# Patient Record
Sex: MALE | Race: WHITE | HISPANIC OR LATINO | Employment: OTHER | URBAN - METROPOLITAN AREA
[De-identification: names, ages, dates, MRNs, and addresses within clinical notes are randomized per-mention and may not be internally consistent; named-entity substitution may affect disease eponyms.]

---

## 2017-01-09 RX ORDER — ASPIRIN 325 MG
TABLET, DELAYED RELEASE (ENTERIC COATED) ORAL
Qty: 30 TABLET | Refills: 0 | Status: SHIPPED | OUTPATIENT
Start: 2017-01-09 | End: 2017-02-10 | Stop reason: SDUPTHER

## 2017-02-07 ENCOUNTER — LAB VISIT (OUTPATIENT)
Dept: LAB | Facility: HOSPITAL | Age: 82
End: 2017-02-07
Attending: INTERNAL MEDICINE
Payer: MEDICARE

## 2017-02-07 DIAGNOSIS — R60.0 LOCALIZED EDEMA: ICD-10-CM

## 2017-02-07 DIAGNOSIS — I10 ESSENTIAL HYPERTENSION: ICD-10-CM

## 2017-02-07 DIAGNOSIS — I70.1 ATHEROSCLEROTIC RAS (RENAL ARTERY STENOSIS), BILATERAL: ICD-10-CM

## 2017-02-07 DIAGNOSIS — N18.4 CKD (CHRONIC KIDNEY DISEASE) STAGE 4, GFR 15-29 ML/MIN: ICD-10-CM

## 2017-02-07 LAB
ALBUMIN SERPL BCP-MCNC: 4 G/DL
ANION GAP SERPL CALC-SCNC: 9 MMOL/L
BASOPHILS # BLD AUTO: 0.05 K/UL
BASOPHILS NFR BLD: 0.5 %
BUN SERPL-MCNC: 33 MG/DL
CALCIUM SERPL-MCNC: 9.6 MG/DL
CHLORIDE SERPL-SCNC: 100 MMOL/L
CO2 SERPL-SCNC: 29 MMOL/L
CREAT SERPL-MCNC: 2.4 MG/DL
DIFFERENTIAL METHOD: ABNORMAL
EOSINOPHIL # BLD AUTO: 0.7 K/UL
EOSINOPHIL NFR BLD: 7.4 %
ERYTHROCYTE [DISTWIDTH] IN BLOOD BY AUTOMATED COUNT: 14 %
EST. GFR  (AFRICAN AMERICAN): 28.2 ML/MIN/1.73 M^2
EST. GFR  (NON AFRICAN AMERICAN): 24.4 ML/MIN/1.73 M^2
GLUCOSE SERPL-MCNC: 96 MG/DL
HCT VFR BLD AUTO: 38.2 %
HGB BLD-MCNC: 12.7 G/DL
LYMPHOCYTES # BLD AUTO: 2.5 K/UL
LYMPHOCYTES NFR BLD: 26.4 %
MCH RBC QN AUTO: 30 PG
MCHC RBC AUTO-ENTMCNC: 33.2 %
MCV RBC AUTO: 90 FL
MONOCYTES # BLD AUTO: 0.7 K/UL
MONOCYTES NFR BLD: 7.5 %
NEUTROPHILS # BLD AUTO: 5.5 K/UL
NEUTROPHILS NFR BLD: 57.5 %
PHOSPHATE SERPL-MCNC: 3.1 MG/DL
PLATELET # BLD AUTO: 485 K/UL
PMV BLD AUTO: 9.4 FL
POTASSIUM SERPL-SCNC: 3.8 MMOL/L
PTH-INTACT SERPL-MCNC: 35 PG/ML
RBC # BLD AUTO: 4.23 M/UL
SODIUM SERPL-SCNC: 138 MMOL/L
WBC # BLD AUTO: 9.59 K/UL

## 2017-02-07 PROCEDURE — 36415 COLL VENOUS BLD VENIPUNCTURE: CPT

## 2017-02-07 PROCEDURE — 80069 RENAL FUNCTION PANEL: CPT

## 2017-02-07 PROCEDURE — 83970 ASSAY OF PARATHORMONE: CPT

## 2017-02-07 PROCEDURE — 85025 COMPLETE CBC W/AUTO DIFF WBC: CPT

## 2017-02-10 RX ORDER — ASPIRIN 325 MG
TABLET, DELAYED RELEASE (ENTERIC COATED) ORAL
Qty: 30 TABLET | Refills: 0 | Status: SHIPPED | OUTPATIENT
Start: 2017-02-10 | End: 2017-03-08 | Stop reason: SDUPTHER

## 2017-02-14 ENCOUNTER — LAB VISIT (OUTPATIENT)
Dept: LAB | Facility: HOSPITAL | Age: 82
End: 2017-02-14
Attending: INTERNAL MEDICINE
Payer: MEDICARE

## 2017-02-14 DIAGNOSIS — N18.4 CKD (CHRONIC KIDNEY DISEASE) STAGE 4, GFR 15-29 ML/MIN: ICD-10-CM

## 2017-02-14 DIAGNOSIS — I10 ESSENTIAL HYPERTENSION: ICD-10-CM

## 2017-02-14 DIAGNOSIS — I70.1 ATHEROSCLEROTIC RAS (RENAL ARTERY STENOSIS), BILATERAL: ICD-10-CM

## 2017-02-14 DIAGNOSIS — R60.0 LOCALIZED EDEMA: ICD-10-CM

## 2017-02-14 LAB
BILIRUB UR QL STRIP: NEGATIVE
CLARITY UR REFRACT.AUTO: CLEAR
COLOR UR AUTO: YELLOW
CREAT UR-MCNC: 124 MG/DL
GLUCOSE UR QL STRIP: NEGATIVE
HGB UR QL STRIP: NEGATIVE
KETONES UR QL STRIP: NEGATIVE
LEUKOCYTE ESTERASE UR QL STRIP: NEGATIVE
NITRITE UR QL STRIP: NEGATIVE
PH UR STRIP: 7 [PH] (ref 5–8)
PROT UR QL STRIP: NEGATIVE
PROT UR-MCNC: 11 MG/DL
PROT/CREAT RATIO, UR: 0.09
SP GR UR STRIP: 1.01 (ref 1–1.03)
URN SPEC COLLECT METH UR: NORMAL
UROBILINOGEN UR STRIP-ACNC: NEGATIVE EU/DL

## 2017-02-14 PROCEDURE — 81003 URINALYSIS AUTO W/O SCOPE: CPT

## 2017-02-14 PROCEDURE — 84156 ASSAY OF PROTEIN URINE: CPT

## 2017-03-08 RX ORDER — ASPIRIN 325 MG
TABLET, DELAYED RELEASE (ENTERIC COATED) ORAL
Qty: 30 TABLET | Refills: 0 | Status: SHIPPED | OUTPATIENT
Start: 2017-03-08 | End: 2017-04-05 | Stop reason: SDUPTHER

## 2017-03-15 ENCOUNTER — OFFICE VISIT (OUTPATIENT)
Dept: NEPHROLOGY | Facility: CLINIC | Age: 82
End: 2017-03-15
Payer: MEDICARE

## 2017-03-15 VITALS
DIASTOLIC BLOOD PRESSURE: 71 MMHG | WEIGHT: 196 LBS | HEIGHT: 64 IN | SYSTOLIC BLOOD PRESSURE: 156 MMHG | HEART RATE: 62 BPM | BODY MASS INDEX: 33.46 KG/M2

## 2017-03-15 DIAGNOSIS — R60.0 LOCALIZED EDEMA: ICD-10-CM

## 2017-03-15 DIAGNOSIS — I70.1 ATHEROSCLEROTIC RAS (RENAL ARTERY STENOSIS), BILATERAL: ICD-10-CM

## 2017-03-15 DIAGNOSIS — N18.30 CKD (CHRONIC KIDNEY DISEASE) STAGE 3, GFR 30-59 ML/MIN: Primary | ICD-10-CM

## 2017-03-15 DIAGNOSIS — I10 ESSENTIAL HYPERTENSION: ICD-10-CM

## 2017-03-15 PROCEDURE — 99214 OFFICE O/P EST MOD 30 MIN: CPT | Mod: S$PBB,,, | Performed by: INTERNAL MEDICINE

## 2017-03-15 PROCEDURE — 99213 OFFICE O/P EST LOW 20 MIN: CPT | Mod: PBBFAC | Performed by: INTERNAL MEDICINE

## 2017-03-15 PROCEDURE — 99999 PR PBB SHADOW E&M-EST. PATIENT-LVL III: CPT | Mod: PBBFAC,,, | Performed by: INTERNAL MEDICINE

## 2017-03-15 RX ORDER — DOXAZOSIN 4 MG/1
4 TABLET ORAL NIGHTLY
Qty: 30 TABLET | Refills: 11 | Status: SHIPPED | OUTPATIENT
Start: 2017-03-15 | End: 2018-03-20 | Stop reason: SDUPTHER

## 2017-03-15 RX ORDER — AMLODIPINE BESYLATE 10 MG/1
10 TABLET ORAL DAILY
COMMUNITY
End: 2017-03-15

## 2017-03-15 NOTE — MR AVS SNAPSHOT
[facility]  [address] [facility] [place] [address]  Phone: [phone]  Fax: [phone]                  Pedro Pro   3/15/2017 3:00 PM   Office Visit    Descripción:  Male : 1935   Personal Médico:  Markus Ponce MD   Departamento:  [facility]           Razón de la megan     Chronic Kidney Disease     Hypertension           Diagnósticos de Esta Visita        Comentarios    CKD (chronic kidney disease) stage 3, GFR 30-59 ml/min    -  Primario     Essential hypertension         Atherosclerotic AUGUSTUS (renal artery stenosis), bilateral         Localized edema                Lista de tareas           Citas próximas        Personal Médico Departamento Tfno del dpto    2017 1:30 PM Gab Cruz MD [facility] [phone]    2017 10:00 AM LABORATORY, [address] [facility] [phone]    2017 10:30 AM Markus Ponce MD [facility] [phone]      Metas (5 Years of Data)     Ninguna      Follow-Up and Disposition     Return in about 4 months (around 7/15/2017).    Follow-up and Disposition History      Recetas para recoger        Disp Refills Start End    doxazosin (CARDURA) 4 MG tablet 30 tablet 11 3/15/2017 3/15/2018    Take 1 tablet (4 mg total) by mouth every evening. - Oral    Farmacia: [facility] - [place] - [address] AT [facility] & [address] de tlfo: #: [phone]         [facility]     [facility] de Enfermeras - Asistencia   Enfermeras registradas de [facility] pueden ayudarle a reservar kyleigh megan, proveer educación para la nahed, asesoría clínica, y otros servicios de asesoramiento.   Llame para sundar servicio gratuito a [phone].             Medicamentos           Mensaje sobre Medicamentos     Verificar los cambios y / o adiciones a al régimen de medicación son los mismos que discutir con al médico.  "Si cualquiera de estos cambios o adiciones son incorrectos, por favor notifique a al proveedor de atención médica.        EMPEZAR a elizabeth estos medicamentos NUEVOS        Refills    doxazosin (CARDURA) 4 MG tablet 11    Sig: Take 1 tablet (4 mg total) by mouth every evening.    Categoría: Normal    Vía: Oral      DEJAR de elizabeth estos medicamentos     amlodipine (NORVASC) 10 MG tablet Take 10 mg by mouth once daily.           Verifique que la siguiente lista de medicamentos es kyleigh representación exacta de los medicamentos que está tomando actualmente. Si no hay ningunos reportados, la lista puede estar en painter. Si no es correcta, por favor póngase en contacto con al proveedor de atención médica. Lleve esta lista con usted en marcus de emergencia.           Medicamentos Actuales     aspirin (ECOTRIN) 325 MG EC tablet TAKE 1 TABLET BY MOUTH DAILY.    BYSTOLIC 10 mg Tab Take 1 tablet by mouth once daily.    clopidogrel (PLAVIX) 75 mg tablet Take 75 mg by mouth once daily.    fenofibrate 160 MG Tab Take 160 mg by mouth once daily.    glimepiride (AMARYL) 2 MG tablet Take 2 mg by mouth before breakfast.    hydrochlorothiazide (HYDRODIURIL) 25 MG tablet TAKE 1 TABLET BY MOUTH DAILY    levothyroxine (SYNTHROID) 75 MCG tablet Take 75 mcg by mouth before breakfast.    nifedipine (PROCARDIA-XL) 90 MG (OSM) TR24 Take 1 tablet (90 mg total) by mouth once daily.    pantoprazole (PROTONIX) 40 MG tablet Take 40 mg by mouth 2 (two) times daily.     pravastatin (PRAVACHOL) 20 MG tablet Take 20 mg by mouth once daily.    doxazosin (CARDURA) 4 MG tablet Take 1 tablet (4 mg total) by mouth every evening.    hydrALAZINE (APRESOLINE) 50 MG tablet Take 1 tablet (50 mg total) by mouth every 12 (twelve) hours.           Información de referencia clínica           Tamia signos vitales edvin     PS [place] BMI ([facility])       156/71 62 5' 4" (1.626 m) 88.9 kg (195 lb 15.8 oz) 33.64 kg/m2       Blood Pressure          Most Recent Value    BP "  (!)  156/71      Alergias     A partir del:  3/15/2017        No Known Allergies      Vacunas     Administradas en la fecha de la visita:  3/15/2017        None      Registrarse para [facility]     La activación de al cuenta [facility] es tan fácil topher 1-2-3!    1) Ir a [email or web address], seleccione Registrarse Ahora, meter el código de activación y al fecha de nacimiento, y seleccione Próximo.    [id number]  Expires: 2017  3:07 PM      2) Crear un nombre de usuario y contraseña para usar cuando se visita [facility] en el futuro y selecciona kyleigh pregunta de seguridad en marucs de que pierda al contraseña y seleccione Próximo.    3) Introduzca al dirección de correo electrónico y myra clic en Registrarse!    Información Adicional  Si tiene alguna pregunta, por favor, e-mail [email or web address] o llame al [phone] para hablar con nuestro personal. Recuerde, [facility] no debe ser usada para necesidades urgentes. En marcus de emergencia médica, llame al 911.        Instrucciones    change  Norvasc to procardia xl 90 mg ( d/c norvasc --patient was taking both)  ;HCTZ 25 mg  to help BP and edema ; hydralazine 50 twice daily ; bystolic 10 mg ( HR 60) ; add cardura 4 mg          Language Assistance Services     ATTENTION: Language assistance services are available, free of charge. Please call [phone].      ATENCIÓN: Si habla español, tiene a al disposición servicios gratuitos de asistencia lingüística. Llame al [phone].     CHÚ Ý: N?u b?n nói Ti?ng Vi?t, có các d?ch v? h? tr? ngôn ng? mi?n phí dành cho b?n. G?i s? [phone].         [facility] cumple con las leyes federales aplicables de derechos civiles y no discrimina por motivos de akash, color, origen nacional, edad, discapacidad, o sexo.                 Pedro Pro   3/15/2017 3:00 PM   Office Visit    Description:  Male : 1935   Provider:  Markus Ponce MD   Department:  [facility]           Reason for  Visit     Chronic Kidney Disease     Hypertension           Diagnoses this Visit        Comments    CKD (chronic kidney disease) stage 3, GFR 30-59 ml/min    -  Primary     Essential hypertension         Atherosclerotic AUGUSTUS (renal artery stenosis), bilateral         Localized edema                To Do List           Future Appointments        Provider Department Dept Phone    5/25/2017 1:30 PM Gab Cruz MD [facility] - Ophthalmology [phone]    6/29/2017 10:00 AM LABORATORY, [address] [facility] [phone]    7/6/2017 10:30 AM [facility] [phone]      Goals     None      Follow-Up and Disposition     Return in about 4 months (around 7/15/2017).    Follow-up and Disposition History       These Medications        Disp Refills Start End    doxazosin (CARDURA) 4 MG tablet 30 tablet 11 3/15/2017 3/15/2018    Take 1 tablet (4 mg total) by mouth every evening. - Oral    Pharmacy: [facility] - [address] AT [facility] Ph #: [phone]         [facility]     [facility] Nurse Care Line - 24/7 Assistance  Registered nurses in the [facility] provide clinical advisement, health education, appointment booking, and other advisory services.  Call for this free service at [phone].             Medications           Message regarding Medications     Verify the changes and/or additions to your medication regime listed below are the same as discussed with your clinician today.  If any of these changes or additions are incorrect, please notify your healthcare provider.        START taking these NEW medications        Refills    doxazosin (CARDURA) 4 MG tablet 11    Sig: Take 1 tablet (4 mg total) by mouth every evening.    Class: Normal    Route: Oral      STOP taking these medications     amlodipine (NORVASC) 10 MG tablet Take 10 mg by mouth once  "daily.           Verify that the below list of medications is an accurate representation of the medications you are currently taking.  If none reported, the list may be blank. If incorrect, please contact your healthcare provider. Carry this list with you in case of emergency.           Current Medications     aspirin (ECOTRIN) 325 MG EC tablet TAKE 1 TABLET BY MOUTH DAILY.    BYSTOLIC 10 mg Tab Take 1 tablet by mouth once daily.    clopidogrel (PLAVIX) 75 mg tablet Take 75 mg by mouth once daily.    fenofibrate 160 MG Tab Take 160 mg by mouth once daily.    glimepiride (AMARYL) 2 MG tablet Take 2 mg by mouth before breakfast.    hydrochlorothiazide (HYDRODIURIL) 25 MG tablet TAKE 1 TABLET BY MOUTH DAILY    levothyroxine (SYNTHROID) 75 MCG tablet Take 75 mcg by mouth before breakfast.    nifedipine (PROCARDIA-XL) 90 MG (OSM) TR24 Take 1 tablet (90 mg total) by mouth once daily.    pantoprazole (PROTONIX) 40 MG tablet Take 40 mg by mouth 2 (two) times daily.     pravastatin (PRAVACHOL) 20 MG tablet Take 20 mg by mouth once daily.    doxazosin (CARDURA) 4 MG tablet Take 1 tablet (4 mg total) by mouth every evening.    hydrALAZINE (APRESOLINE) 50 MG tablet Take 1 tablet (50 mg total) by mouth every 12 (twelve) hours.           Clinical Reference Information           Your Vitals Were     BP Pulse Height Weight BMI       156/71 62 5' 4" (1.626 m) 88.9 kg (195 lb 15.8 oz) 33.64 kg/m2       Blood Pressure          Most Recent Value    BP  (!)  156/71      Allergies as of 3/15/2017     No Known Allergies      Immunizations Administered on Date of Encounter - 3/15/2017     None      [facility] Sign-Up     Activating your [facility] account is as easy as 1-2-3!     1) Visit [email or web address], select Sign Up Now, enter this activation code and your date of birth, then select Next.  [email or web address]  Expires: 4/29/2017  3:07 PM      2) Create a username and password to use when you visit [facility] in the future and select a " security question in case you lose your password and select Next.    3) Enter your e-mail address and click Sign Up!    Additional Information  If you have questions, please e-mail [email or web address] or call [phone] to talk to our [facility] staff. Remember, [facility] is NOT to be used for urgent needs. For medical emergencies, dial 911.         Instructions    change  Norvasc to procardia xl 90 mg ( d/c norvasc --patient was taking both)  ;HCTZ 25 mg  to help BP and edema ; hydralazine 50 twice daily ; bystolic 10 mg ( HR 60) ; add cardura 4 mg          Language Assistance Services     ATTENTION: Language assistance services are available, free of charge. Please call [phone].      ATENCIÓN: Si joan guzman, tiene a al disposición servicios gratuitos de asistencia lingüística. Llame al [phone].     CHÚ Ý: N?u b?n nói Ti?ng Vi?t, có các d?ch v? h? tr? ngôn ng? mi?n phí dành cho b?n. G?i s? [phone].         [facility] complies with applicable Federal civil rights laws and does not discriminate on the basis of race, color, national origin, age, disability, or sex.

## 2017-03-15 NOTE — PATIENT INSTRUCTIONS
change  Norvasc to procardia xl 90 mg ( d/c norvasc --patient was taking both)  ;HCTZ 25 mg  to help BP and edema ; hydralazine 50 twice daily ; bystolic 10 mg ( HR 60) ; add cardura 4 mg

## 2017-03-15 NOTE — PROGRESS NOTES
Subjective:       Patient ID: Pedro Pro is a 81 y.o. Unknown male who presents for new evaluation of Chronic Kidney Disease and Hypertension    Hypertension   Pertinent negatives include no chest pain, headaches, neck pain, palpitations or shortness of breath.   Edema   Pertinent negatives include no abdominal pain, arthralgias, chest pain, chills, congestion, coughing, diaphoresis, fatigue, fever, headaches, joint swelling, nausea, neck pain, rash or vomiting.   Patient is a 79-year-old male from [facility]; does not like doctors was  diagnosed with type II diabetes in  ; was found to have a creatinine 1.8 ; work showed diabetic nephropathy.  He does not speak English he had a friend who was translating for him.  From the history of present illness I gather was that he has had high blood pressure and multiple family members with severe diabetes and one of the siblings  of kidney failure ; he never got himself checked out;    10/2014 had scalp shingles s/p pain and meds     2014 he had  angio for PAD for Dr. Garcia repeat on both legs in 2015 and 2015 s/p PTCA     3/2015 his creatinine went up to 2.8 with acute kidney injury ; stopped  his ACE inhibitor ;  prostate biopsy c/w Prostate cancer pending staging and treatment options     2015 ACE inhibitor continued to be stopped, creatinine came down to 2.1, added calcium channel blocker for blood pressure control and hydrochlorothiazide for blood pressure and edema    2015 comes back for follow-up creatinine is stable at 2.1; hydrochlorothiazide increased to 25 mg to help treat edema and hypertension    10/2015 Norvasc 10 mg increased by Dr. Garcia     3/2016 High PSA dr. Kamara bx proven prostate cancer : no Rx since PSA stable pending 2nd bx     2016 creatinine higher 2.5     2016 GFR 30 % pm cystatin C 2nd prostate Bx shows stable prostate cancer      Review of Systems   Constitutional: Negative.  Negative for activity  "change, appetite change, chills, diaphoresis, fatigue and fever.   HENT: Negative.  Negative for congestion and trouble swallowing.    Eyes: Negative.    Respiratory: Negative.  Negative for cough, chest tightness, shortness of breath and wheezing.    Cardiovascular: Negative.  Negative for chest pain, palpitations and leg swelling.   Gastrointestinal: Negative.  Negative for abdominal distention, abdominal pain, nausea and vomiting.   Genitourinary: Negative.  Negative for decreased urine volume, difficulty urinating, dysuria, enuresis, flank pain, frequency, hematuria, penile swelling, scrotal swelling and urgency.   Musculoskeletal: Negative.  Negative for arthralgias, back pain, joint swelling and neck pain.   Skin: Negative for rash.   Neurological: Negative for tremors, seizures and headaches.        He has numbness and tingling in both feet occasional pain at nighttime   Psychiatric/Behavioral: Negative.  Negative for confusion and sleep disturbance. The patient is not nervous/anxious.        Objective:       Vitals:    03/15/17 1443   BP: (!) 156/71   Pulse: 62   Weight: 88.9 kg (195 lb 15.8 oz)   Height: 5' 4" (1.626 m)     Weight loss 10 ib ( 210 )       Physical Exam      Constitutional: He is oriented to person, place, and time. He appears well-developed and well-nourished. No distress.   HENT:   Head: Normocephalic.   Eyes: Conjunctivae and EOM are normal. Pupils are equal, round, and reactive to light. No scleral icterus.   Bilateral hypertensive and diabetic retinopathy   Neck: Normal range of motion. Neck supple. No JVD present. Carotid bruit is present. No thyromegaly present.   Cardiovascular: Normal rate, regular rhythm, normal heart sounds and intact distal pulses. Exam reveals no gallop and no friction rub.   No murmur heard.  Loud P2   Pulmonary/Chest: Effort normal and breath sounds normal. No stridor. No respiratory distress. He has no wheezes. He has no rales. He exhibits no tenderness. "   Abdominal: Soft. Bowel sounds are normal. He exhibits no distension and no mass. There is no tenderness. There is no rebound and no guarding.   morbid obesity; positive truncal obesity   Musculoskeletal: Normal range of motion. He exhibits less edema now .   Cannot rule out cholesterol emboli   Neurological: He is alert and oriented to person, place, and time. He has normal reflexes. No cranial nerve deficit. He exhibits normal muscle tone. Coordination normal.   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 No rash noted. He is not diaphoretic. No erythema. No pallor.   Cholesterol emboli in Feet bilaterally    Psychiatric: He has a normal mood and affect. His behavior is normal. Judgment and thought content normal.       Lab Results   Component Value Date    CREATININE 2.4 (H) 02/07/2017    BUN 33 (H) 02/07/2017     02/07/2017    K 3.8 02/07/2017     02/07/2017    CO2 29 02/07/2017     Lab Results   Component Value Date    WBC 9.59 02/07/2017    HGB 12.7 (L) 02/07/2017    HCT 38.2 (L) 02/07/2017    MCV 90 02/07/2017     (H) 02/07/2017     Lab Results   Component Value Date    PTH 35.0 02/07/2017    CALCIUM 9.6 02/07/2017    PHOS 3.1 02/07/2017     Lab Results   Component Value Date    URICACID 8.1 (H) 12/01/2014        no proetinuria       Cystatin C GFR 30 % 12/2016 on Creatinine of 2.5      Assessment:       1. CKD (chronic kidney disease) stage 3, GFR 30-59 ml/min    2. Essential hypertension    3. Atherosclerotic AUGUSTUS (renal artery stenosis), bilateral     4. Localized edema        Plan:             1.  Chronic kidney disease stage III/ IV : likely complications of Dye and cholesterol emboli ;  Keep plavix for now ; no heavy proteinuria; GFR 30 % ; no ACEI for now ; PTH is normal     2.  Hypertension: dr. Garcia changed medicines: target blood pressure 130/80;change  Norvasc to procardia xl 90 mg ( d/c norvasc --patient was taking  both)  ;HCTZ 25 mg  to help BP and edema ; hydralazine 50 twice daily ; bystolic 10 mg ( HR 60) ; add cardura 4 mg     3.  Diabetic retinopathy:  eye examination with ophthalmologist yearly ; has had history of low sugars but none below 70      4.  Carotid bruit: USD -gualberto  carotid stenosis start him on aspirin once a day for stroke prevention    5. PAD : s/p bilateral PTCA ; on  CCB     6. Cholesterol emboli of LE : keep plavix     7. Prostate cancer:  High PSA s/p  second Bx follow up with Dr. Mcmahan.     For contact call : Abbey Jesus [phone]         Follow up 2 months    Cc Dr. Angel Palacios; dr. Garcia

## 2017-04-05 RX ORDER — ASPIRIN 325 MG
TABLET, DELAYED RELEASE (ENTERIC COATED) ORAL
Qty: 30 TABLET | Refills: 0 | Status: SHIPPED | OUTPATIENT
Start: 2017-04-05 | End: 2017-05-05 | Stop reason: SDUPTHER

## 2017-05-05 RX ORDER — ASPIRIN 325 MG
TABLET, DELAYED RELEASE (ENTERIC COATED) ORAL
Qty: 30 TABLET | Refills: 0 | Status: SHIPPED | OUTPATIENT
Start: 2017-05-05 | End: 2017-06-01 | Stop reason: SDUPTHER

## 2017-05-25 ENCOUNTER — OFFICE VISIT (OUTPATIENT)
Dept: OPHTHALMOLOGY | Facility: CLINIC | Age: 82
End: 2017-05-25
Payer: MEDICARE

## 2017-05-25 DIAGNOSIS — Z96.1 PSEUDOPHAKIA, BOTH EYES: ICD-10-CM

## 2017-05-25 DIAGNOSIS — H04.123 DRY EYES, BILATERAL: ICD-10-CM

## 2017-05-25 DIAGNOSIS — E11.9 DIABETES MELLITUS TYPE 2 WITHOUT RETINOPATHY: Primary | ICD-10-CM

## 2017-05-25 DIAGNOSIS — H11.002 PTERYGIUM, LEFT: ICD-10-CM

## 2017-05-25 PROCEDURE — 92014 COMPRE OPH EXAM EST PT 1/>: CPT | Mod: S$PBB,,, | Performed by: OPHTHALMOLOGY

## 2017-05-25 PROCEDURE — 99212 OFFICE O/P EST SF 10 MIN: CPT | Mod: PBBFAC,PO | Performed by: OPHTHALMOLOGY

## 2017-05-25 PROCEDURE — 99999 PR PBB SHADOW E&M-EST. PATIENT-LVL II: CPT | Mod: PBBFAC,,, | Performed by: OPHTHALMOLOGY

## 2017-05-25 RX ORDER — AMLODIPINE BESYLATE 10 MG/1
10 TABLET ORAL DAILY
COMMUNITY
End: 2018-06-20 | Stop reason: SDUPTHER

## 2017-05-25 NOTE — PROGRESS NOTES
SUBJECTIVE:   Pedro Pro is a 81 y.o. male   Uncorrected distance visual acuity was 20/25 in the right eye and 20/40 -2 in the left eye.   Chief Complaint   Patient presents with    Diabetic Eye Exam     yearly dm exam        HPI:  HPI     Diabetic Eye Exam    Additional comments: yearly dm exam           Comments   1.PCIOL OD 2-5-14 LensX and arcuatesx2 were opened in Surgery  PCIOL OS 5-31-05 SA60AT +25.0 Dr Perez  2.DM x 2014  3. Dry Eyes  4. Pterygium OS    Refresh once daily OU       Last edited by Martina Stevenson MA on 5/25/2017  1:28 PM. (History)        Assessment /Plan :  1. Diabetes mellitus type 2 without retinopathy No diabetic retinopathy at this time. Reviewed diabetic eye precautions including avoiding tobacco use,  Good glucose control, and importance of regular follow up.    2. Pseudophakia, both eyes stable   3. Pterygium, left monitor for now   4. Dry eyes, bilateral increase the use of the artificial tears OU     RTC in 1 year or prn any changes

## 2017-06-01 RX ORDER — ASPIRIN 325 MG
TABLET, DELAYED RELEASE (ENTERIC COATED) ORAL
Qty: 30 TABLET | Refills: 0 | Status: SHIPPED | OUTPATIENT
Start: 2017-06-01 | End: 2017-06-30 | Stop reason: SDUPTHER

## 2017-06-08 ENCOUNTER — TELEPHONE (OUTPATIENT)
Dept: NEPHROLOGY | Facility: CLINIC | Age: 82
End: 2017-06-08

## 2017-06-08 DIAGNOSIS — N18.30 CKD (CHRONIC KIDNEY DISEASE) STAGE 3, GFR 30-59 ML/MIN: Primary | ICD-10-CM

## 2017-06-08 NOTE — TELEPHONE ENCOUNTER
Returned call to johnie, schedule pt. For appt. With Dr. Ponce on Monday, advised to have pt. Go to ER or urgent care if swelling gets worse over the weekend or if pt. Starts experiencing SOB she verbalized understanding.

## 2017-06-08 NOTE — TELEPHONE ENCOUNTER
----- Message from Ina Barone sent at 6/8/2017  3:39 PM CDT -----  Contact: Mi Garcia - granddaughter  Patient has an appointment to see Dr Ponce in July, but his feet and eye swollen.  His cardiologist told him it could be one of the medications that he is on.  He would like to come in now and if he cannot see Dr Ponce, he will see any of the doctors.   Call granddaughter at [phone].                                                                garcía

## 2017-06-12 ENCOUNTER — LAB VISIT (OUTPATIENT)
Dept: LAB | Facility: HOSPITAL | Age: 82
End: 2017-06-12
Attending: INTERNAL MEDICINE
Payer: MEDICARE

## 2017-06-12 ENCOUNTER — OFFICE VISIT (OUTPATIENT)
Dept: NEPHROLOGY | Facility: CLINIC | Age: 82
End: 2017-06-12
Payer: MEDICARE

## 2017-06-12 VITALS
BODY MASS INDEX: 30.86 KG/M2 | DIASTOLIC BLOOD PRESSURE: 70 MMHG | HEIGHT: 64 IN | WEIGHT: 180.75 LBS | SYSTOLIC BLOOD PRESSURE: 136 MMHG | HEART RATE: 60 BPM

## 2017-06-12 DIAGNOSIS — N18.4 CKD (CHRONIC KIDNEY DISEASE) STAGE 4, GFR 15-29 ML/MIN: ICD-10-CM

## 2017-06-12 DIAGNOSIS — I10 ESSENTIAL HYPERTENSION: Primary | ICD-10-CM

## 2017-06-12 DIAGNOSIS — I70.1 ATHEROSCLEROTIC RAS (RENAL ARTERY STENOSIS), BILATERAL: ICD-10-CM

## 2017-06-12 DIAGNOSIS — R60.0 LOCALIZED EDEMA: ICD-10-CM

## 2017-06-12 DIAGNOSIS — N17.9 AKI (ACUTE KIDNEY INJURY): ICD-10-CM

## 2017-06-12 DIAGNOSIS — N18.30 CKD (CHRONIC KIDNEY DISEASE) STAGE 3, GFR 30-59 ML/MIN: ICD-10-CM

## 2017-06-12 DIAGNOSIS — I10 ESSENTIAL HYPERTENSION: ICD-10-CM

## 2017-06-12 PROCEDURE — 1126F AMNT PAIN NOTED NONE PRSNT: CPT | Mod: ,,, | Performed by: INTERNAL MEDICINE

## 2017-06-12 PROCEDURE — 1159F MED LIST DOCD IN RCRD: CPT | Mod: ,,, | Performed by: INTERNAL MEDICINE

## 2017-06-12 PROCEDURE — 99213 OFFICE O/P EST LOW 20 MIN: CPT | Mod: PBBFAC,PO | Performed by: INTERNAL MEDICINE

## 2017-06-12 PROCEDURE — 99999 PR PBB SHADOW E&M-EST. PATIENT-LVL III: CPT | Mod: PBBFAC,,, | Performed by: INTERNAL MEDICINE

## 2017-06-12 PROCEDURE — 99214 OFFICE O/P EST MOD 30 MIN: CPT | Mod: S$PBB,,, | Performed by: INTERNAL MEDICINE

## 2017-06-12 RX ORDER — PRAVASTATIN SODIUM 40 MG/1
20 TABLET ORAL DAILY
COMMUNITY

## 2017-06-12 NOTE — PROGRESS NOTES
Subjective:       Patient ID: Pedro Pro is a 81 y.o.   male who presents for follow up  evaluation of Chronic Kidney Disease; Hypertension; Edema; and Acute Renal Failure    Hypertension   Pertinent negatives include no chest pain, headaches, neck pain, palpitations or shortness of breath.   Edema   Pertinent negatives include no abdominal pain, arthralgias, chest pain, chills, congestion, coughing, diaphoresis, fatigue, fever, headaches, joint swelling, nausea, neck pain, rash or vomiting.   Patient is a 79-year-old male from [facility]; does not like doctors was  diagnosed with type II diabetes in  ; was found to have a creatinine 1.8 ; work showed diabetic nephropathy.  He does not speak English he had a friend who was translating for him.  From the history of present illness I gather was that he has had high blood pressure and multiple family members with severe diabetes and one of the siblings  of kidney failure ; he never got himself checked out;    10/2014 had scalp shingles s/p pain and meds     2014 he had  angio for PAD for Dr. Garcia repeat on both legs in 2015 and 2015 s/p PTCA     3/2015 his creatinine went up to 2.8 with acute kidney injury ; stopped  his ACE inhibitor ;  prostate biopsy c/w Prostate cancer pending staging and treatment options     2015 ACE inhibitor continued to be stopped, creatinine came down to 2.1, added calcium channel blocker for blood pressure control and hydrochlorothiazide for blood pressure and edema    2015 comes back for follow-up creatinine is stable at 2.1; hydrochlorothiazide increased to 25 mg to help treat edema and hypertension    10/2015 Norvasc 10 mg increased by Dr. Garcia     3/2016 High PSA dr. Kamara bx proven prostate cancer : no Rx since PSA stable pending 2nd bx     2016 creatinine higher 2.5     2016 GFR 30 % pm cystatin C 2nd prostate Bx shows stable prostate cancer     2017 creatinine was 2.4 GFR  "unchanged from 30%.  Norvasc.  Since the patient was on Norvasc and Procardia.  Hydrocodone thiazide added for edema.  Cardura 4 mg added for blood pressure control      June 2017 patient comes back for follow-up with a creatinine of 3.1, approximately GFR 20-25%.  Swelling of the legs and eyes.  Status post ophthalmology appointment in May 2017 showing no retinopathy     Review of Systems   Constitutional: Negative.  Negative for activity change, appetite change, chills, diaphoresis, fatigue and fever.   HENT: Negative.  Negative for congestion and trouble swallowing.    Eyes: Negative.    Respiratory: Negative.  Negative for cough, chest tightness, shortness of breath and wheezing.    Cardiovascular: Positive for leg swelling. Negative for chest pain and palpitations.   Gastrointestinal: Negative.  Negative for abdominal distention, abdominal pain, nausea and vomiting.   Genitourinary: Negative.  Negative for decreased urine volume, difficulty urinating, dysuria, enuresis, flank pain, frequency, hematuria, penile swelling, scrotal swelling and urgency.   Musculoskeletal: Negative.  Negative for arthralgias, back pain, joint swelling and neck pain.   Skin: Negative for rash.   Neurological: Negative for tremors, seizures and headaches.        He has numbness and tingling in both feet occasional pain at nighttime   Psychiatric/Behavioral: Negative.  Negative for confusion and sleep disturbance. The patient is not nervous/anxious.        Objective:       Vitals:    06/12/17 1403   BP: 136/70   Pulse: 60   Weight: 82 kg (180 lb 12.4 oz)   Height: 5' 4" (1.626 m)     Weight loss 10 ib ( 210 ) 3/2017    Wt loss 16 ib (200) 6/2017       Physical Exam      Constitutional: He is oriented to person, place, and time. He appears well-developed and well-nourished. No distress.   HENT:   Head: Normocephalic.   Eyes: Conjunctivae and EOM are normal. Pupils are equal, round, and reactive to light. No scleral icterus.   Neck: " Normal range of motion. Neck supple. No JVD present. No thyromegaly present.   Cardiovascular: Normal rate, regular rhythm, normal heart sounds and intact distal pulses. Exam reveals no gallop and no friction rub.   No murmur heard.  Loud P2   Pulmonary/Chest: Effort normal and breath sounds normal. No stridor. No respiratory distress. He has no wheezes. He has no rales. He exhibits no tenderness.   Abdominal: Soft. Bowel sounds are normal. He exhibits no distension and no mass. There is no tenderness. There is no rebound and no guarding.   morbid obesity; positive truncal obesity   Musculoskeletal: Normal range of motion. He exhibits 2-3 + edema now .   Cannot rule out cholesterol emboli   Neurological: He is alert and oriented to person, place, and time. He has normal reflexes. No cranial nerve deficit. He exhibits normal muscle tone. Coordination normal.   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 No rash noted. He is not diaphoretic. No erythema. No pallor.   Cholesterol emboli in Feet bilaterally    Psychiatric: He has a normal mood and affect. His behavior is normal. Judgment and thought content normal.       Lab Results   Component Value Date    CREATININE 3.1 (H) 06/12/2017    BUN 36 (H) 06/12/2017     06/12/2017    K 4.4 06/12/2017     06/12/2017    CO2 26 06/12/2017     Lab Results   Component Value Date    WBC 7.28 06/12/2017    HGB 11.2 (L) 06/12/2017    HCT 34.2 (L) 06/12/2017    MCV 92 06/12/2017     06/12/2017     Lab Results   Component Value Date    PTH 35.0 02/07/2017    CALCIUM 9.0 06/12/2017    PHOS 3.5 06/12/2017     Lab Results   Component Value Date    URICACID 8.1 (H) 12/01/2014        no proetinuria       Cystatin C GFR 30 % 12/2016 on Creatinine of 2.5      Assessment:       1. Essential hypertension    2. Atherosclerotic AUGUSTUS (renal artery stenosis), bilateral     3. Localized edema    4. CKD (chronic  kidney disease) stage 4, GFR 15-29 ml/min    5. CARMEN (acute kidney injury)        Plan:         CARMEN: ? Etiology. Severe leg swelling on Norvasc and procardia ; hold procardia     1.  Chronic kidney disease stage III/ IV : likely complications of Dye and cholesterol emboli ;  Keep plavix for now ; no heavy proteinuria; GFR 20-30 % ; no ACEI for now ; PTH is normal     2.  Hypertension: dr. Garcia changed medicines: target blood pressure 130/80;change  Norvasc + procardia xl 90 mg ( d/c procardia --patient was taking both)  ;HCTZ 25 mg  to help BP and edema ; hydralazine 50 twice daily ; bystolic 10 mg ( HR 60) ; add cardura 4 mg     3.  Diabetic retinopathy:  eye examination with ophthalmologist yearly ; has had history of low sugars but none below 70      4.  Carotid bruit: USD -gualberto  carotid stenosis start him on aspirin once a day for stroke prevention    5. PAD : s/p bilateral PTCA ; on  CCB     6. Cholesterol emboli of LE : keep plavix     7. Prostate cancer:  High PSA s/p  second Bx follow up with Dr. Mcmahan.     For contact call : Abbey Jesus [phone]         Follow up 2 months    Cc Dr. Angel Palacios; dr. Garcia

## 2017-06-13 LAB
CREAT UR-MCNC: 153 MG/DL
PROT UR-MCNC: 9 MG/DL
PROT/CREAT RATIO, UR: 0.06

## 2017-06-21 ENCOUNTER — LAB VISIT (OUTPATIENT)
Dept: LAB | Facility: HOSPITAL | Age: 82
End: 2017-06-21
Attending: INTERNAL MEDICINE
Payer: MEDICARE

## 2017-06-21 ENCOUNTER — OFFICE VISIT (OUTPATIENT)
Dept: NEPHROLOGY | Facility: CLINIC | Age: 82
End: 2017-06-21
Payer: MEDICARE

## 2017-06-21 VITALS
HEART RATE: 74 BPM | BODY MASS INDEX: 34.13 KG/M2 | WEIGHT: 199.94 LBS | SYSTOLIC BLOOD PRESSURE: 160 MMHG | HEIGHT: 64 IN | DIASTOLIC BLOOD PRESSURE: 60 MMHG

## 2017-06-21 DIAGNOSIS — N18.4 CKD (CHRONIC KIDNEY DISEASE) STAGE 4, GFR 15-29 ML/MIN: ICD-10-CM

## 2017-06-21 DIAGNOSIS — I70.1 ATHEROSCLEROTIC RAS (RENAL ARTERY STENOSIS), BILATERAL: ICD-10-CM

## 2017-06-21 DIAGNOSIS — I10 ESSENTIAL HYPERTENSION: ICD-10-CM

## 2017-06-21 DIAGNOSIS — N17.9 AKI (ACUTE KIDNEY INJURY): Primary | ICD-10-CM

## 2017-06-21 DIAGNOSIS — N17.9 AKI (ACUTE KIDNEY INJURY): ICD-10-CM

## 2017-06-21 DIAGNOSIS — R60.0 LOCALIZED EDEMA: ICD-10-CM

## 2017-06-21 LAB
ALBUMIN SERPL BCP-MCNC: 4.1 G/DL
ANION GAP SERPL CALC-SCNC: 9 MMOL/L
BUN SERPL-MCNC: 42 MG/DL
CALCIUM SERPL-MCNC: 9.4 MG/DL
CHLORIDE SERPL-SCNC: 103 MMOL/L
CO2 SERPL-SCNC: 27 MMOL/L
CREAT SERPL-MCNC: 2.6 MG/DL
EST. GFR  (AFRICAN AMERICAN): 26 ML/MIN/1.73 M^2
EST. GFR  (NON AFRICAN AMERICAN): 22 ML/MIN/1.73 M^2
GLUCOSE SERPL-MCNC: 74 MG/DL
PHOSPHATE SERPL-MCNC: 3.5 MG/DL
POTASSIUM SERPL-SCNC: 4 MMOL/L
SODIUM SERPL-SCNC: 139 MMOL/L

## 2017-06-21 PROCEDURE — 1159F MED LIST DOCD IN RCRD: CPT | Mod: ,,, | Performed by: INTERNAL MEDICINE

## 2017-06-21 PROCEDURE — 99214 OFFICE O/P EST MOD 30 MIN: CPT | Mod: S$PBB,,, | Performed by: INTERNAL MEDICINE

## 2017-06-21 PROCEDURE — 1126F AMNT PAIN NOTED NONE PRSNT: CPT | Mod: ,,, | Performed by: INTERNAL MEDICINE

## 2017-06-21 PROCEDURE — 99213 OFFICE O/P EST LOW 20 MIN: CPT | Mod: PBBFAC | Performed by: INTERNAL MEDICINE

## 2017-06-21 PROCEDURE — 99999 PR PBB SHADOW E&M-EST. PATIENT-LVL III: CPT | Mod: PBBFAC,,, | Performed by: INTERNAL MEDICINE

## 2017-06-21 RX ORDER — TORSEMIDE 20 MG/1
20 TABLET ORAL DAILY
Qty: 30 TABLET | Refills: 11 | Status: SHIPPED | OUTPATIENT
Start: 2017-06-21 | End: 2021-01-25 | Stop reason: SDUPTHER

## 2017-06-21 RX ORDER — POTASSIUM CHLORIDE 1.5 G/1.58G
20 POWDER, FOR SOLUTION ORAL DAILY
Qty: 90 PACKET | Refills: 2 | Status: SHIPPED | OUTPATIENT
Start: 2017-06-21 | End: 2017-09-19

## 2017-06-21 RX ORDER — HYDRALAZINE HYDROCHLORIDE 50 MG/1
50 TABLET, FILM COATED ORAL EVERY 12 HOURS
Qty: 60 TABLET | Refills: 2 | Status: SHIPPED | OUTPATIENT
Start: 2017-06-21 | End: 2017-08-03 | Stop reason: SDUPTHER

## 2017-06-21 NOTE — PATIENT INSTRUCTIONS
CARMEN: ? Etiology. Severe leg swelling improved off procardia.  DC Procardia now continue with Norvasc.    1.  Chronic kidney disease stage III/ IV : likely complications of Dye and cholesterol emboli ;  Keep plavix for now ; no heavy proteinuria; GFR 20-30 % ; no ACEI for now ; PTH is normal     2.  Hypertension: dr. Garcia changed medicines: target blood pressure 130/80;change  Norvasc 10 mg stop HCTZ 25 mg  to help BP and edema ; hydralazine 50 twice daily ; bystolic 10 mg ( HR 60) ;  cardura 4 mg ; taking lasix 40 mg half tablet and kcl ;     3.  Diabetic retinopathy:  eye examination with ophthalmologist yearly ; has had history of low sugars but none below 70      4.  Carotid bruit: USD -gualberto  carotid stenosis start him on aspirin once a day for stroke prevention    5. Edema in LE Brown: on cardura and Norvasc off procardia : Add KCL 20 ; add torsemide 20 mg and follow up in 4 weeks     6. Cholesterol emboli of LE : keep plavix     7. Prostate cancer:  High PSA s/p  second Bx follow up with Dr. Mcmahan.

## 2017-06-21 NOTE — PROGRESS NOTES
Subjective:       Patient ID: Pedro Pro is a 81 y.o.   male who presents for follow up  evaluation of Acute Renal Failure; Chronic Kidney Disease; Edema; and Hypertension    Hypertension   Pertinent negatives include no chest pain, headaches, neck pain, palpitations or shortness of breath.   Edema   Pertinent negatives include no abdominal pain, arthralgias, chest pain, chills, congestion, coughing, diaphoresis, fatigue, fever, headaches, joint swelling, nausea, neck pain, rash or vomiting.   Patient is a 79-year-old male from [facility]; does not like doctors was  diagnosed with type II diabetes in  ; was found to have a creatinine 1.8 ; work showed diabetic nephropathy.  He does not speak English he had a friend who was translating for him.  From the history of present illness I gather was that he has had high blood pressure and multiple family members with severe diabetes and one of the siblings  of kidney failure ; he never got himself checked out;    10/2014 had scalp shingles s/p pain and meds     2014 he had  angio for PAD for Dr. Garcia repeat on both legs in 2015 and 2015 s/p PTCA     3/2015 his creatinine went up to 2.8 with acute kidney injury ; stopped  his ACE inhibitor ;  prostate biopsy c/w Prostate cancer pending staging and treatment options     2015 ACE inhibitor continued to be stopped, creatinine came down to 2.1, added calcium channel blocker for blood pressure control and hydrochlorothiazide for blood pressure and edema    2015 comes back for follow-up creatinine is stable at 2.1; hydrochlorothiazide increased to 25 mg to help treat edema and hypertension    10/2015 Norvasc 10 mg increased by Dr. Garcia     3/2016 High PSA dr. Kamara bx proven prostate cancer : no Rx since PSA stable pending 2nd bx     2016 creatinine higher 2.5     2016 GFR 30 % pm cystatin C 2nd prostate Bx shows stable prostate cancer     2017 creatinine was 2.4 GFR  "unchanged from 30%.  Norvasc.  Since the patient was on Norvasc and Procardia.  Hydrocodone thiazide added for edema.  Cardura 4 mg added for blood pressure control      June 2017 patient comes back for follow-up with a creatinine of 3.1, approximately GFR 20-25%.  Swelling of the legs and eyes.  Status post ophthalmology appointment in May 2017 showing no retinopathy.  Due to severe swelling of the legs we stopped the Procardia.  Acute kidney injury was attributed to over medications because of combination of Procardia and Norvasc.  6/21/17 creatinine down to 2.6 off Procardia     Review of Systems   Constitutional: Negative.  Negative for activity change, appetite change, chills, diaphoresis, fatigue and fever.   HENT: Negative.  Negative for congestion and trouble swallowing.    Eyes: Negative.    Respiratory: Negative.  Negative for cough, chest tightness, shortness of breath and wheezing.    Cardiovascular: Positive for leg swelling. Negative for chest pain and palpitations.   Gastrointestinal: Negative.  Negative for abdominal distention, abdominal pain, nausea and vomiting.   Genitourinary: Negative.  Negative for decreased urine volume, difficulty urinating, dysuria, enuresis, flank pain, frequency, hematuria, penile swelling, scrotal swelling and urgency.   Musculoskeletal: Negative.  Negative for arthralgias, back pain, joint swelling and neck pain.   Skin: Negative for rash.   Neurological: Negative for tremors, seizures and headaches.        He has numbness and tingling in both feet occasional pain at nighttime   Psychiatric/Behavioral: Negative.  Negative for confusion and sleep disturbance. The patient is not nervous/anxious.        Objective:       Vitals:    06/21/17 1009   BP: (!) 160/60   Pulse: 74   Weight: 90.7 kg (199 lb 15.3 oz)   Height: 5' 4" (1.626 m)     Weight loss 10 ib ( 210 ) 3/2017    Wt loss 16 ib (200) 6/2017       Physical Exam      Constitutional: He is oriented to person, place, " and time. He appears well-developed and well-nourished. No distress.   HENT:   Head: Normocephalic.   Eyes: Conjunctivae and EOM are normal. Pupils are equal, round, and reactive to light. No scleral icterus.   Neck: Normal range of motion. Neck supple. No JVD present. No thyromegaly present.   Cardiovascular: Normal rate, regular rhythm, normal heart sounds and intact distal pulses. Exam reveals no gallop and no friction rub.   No murmur heard.  Loud P2   Pulmonary/Chest: Effort normal and breath sounds normal. No stridor. No respiratory distress. He has no wheezes. He has no rales. He exhibits no tenderness.   Abdominal: Soft. Bowel sounds are normal. He exhibits no distension and no mass. There is no tenderness. There is no rebound and no guarding.   morbid obesity; positive truncal obesity   Musculoskeletal: Normal range of motion. He exhibits 1 -2+ edema now .   Cannot rule out cholesterol emboli   Neurological: He is alert and oriented to person, place, and time. He has normal reflexes. No cranial nerve deficit. He exhibits normal muscle tone. Coordination normal.   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 No rash noted. He is not diaphoretic. No erythema. No pallor.   Cholesterol emboli in Feet bilaterally    Psychiatric: He has a normal mood and affect. His behavior is normal. Judgment and thought content normal.       Lab Results   Component Value Date    CREATININE 2.6 (H) 06/21/2017    BUN 42 (H) 06/21/2017     06/21/2017    K 4.0 06/21/2017     06/21/2017    CO2 27 06/21/2017     Lab Results   Component Value Date    WBC 7.28 06/12/2017    HGB 11.2 (L) 06/12/2017    HCT 34.2 (L) 06/12/2017    MCV 92 06/12/2017     06/12/2017     Lab Results   Component Value Date    PTH 48.0 06/12/2017    CALCIUM 9.4 06/21/2017    PHOS 3.5 06/21/2017     Lab Results   Component Value Date    URICACID 8.1 (H) 12/01/2014        no  proetinuria       Cystatin C GFR 30 % 12/2016 on Creatinine of 2.5      Assessment:       1. CARMEN (acute kidney injury)    2. CKD (chronic kidney disease) stage 4, GFR 15-29 ml/min    3. Localized edema    4. Atherosclerotic AUGUSTUS (renal artery stenosis), bilateral     5. Essential hypertension        Plan:         CARMEN: ? Etiology. Severe leg swelling improved off procardia.  DC Procardia now continue with Norvasc.    1.  Chronic kidney disease stage III/ IV : likely complications of Dye and cholesterol emboli ;  Keep plavix for now ; no heavy proteinuria; GFR 20-30 % ; no ACEI for now ; PTH is normal     2.  Hypertension: dr. Garcia changed medicines: target blood pressure 130/80;change  Norvasc 10 mg stop HCTZ 25 mg  to help BP and edema ; hydralazine 50 twice daily ; bystolic 10 mg ( HR 60) ;  cardura 4 mg ;     3.  Diabetic retinopathy:  eye examination with ophthalmologist yearly ; has had history of low sugars but none below 70      4.  Carotid bruit: USD -gualberto  carotid stenosis start him on aspirin once a day for stroke prevention    5. Edema in LE Brown: on cardura and Norvasc off procardia :Add KCL 20 ; add torsemide 20 mg and follow up in 4 weeks     6. Cholesterol emboli of LE : keep plavix     7. Prostate cancer:  High PSA s/p  second Bx follow up with Dr. Mcmahan.     All plans d/w      For contact call : Abbey Jesus [phone]         Follow up 2 months    Cc Dr. Angel Palacios; dr. Garcia

## 2017-06-30 RX ORDER — ASPIRIN 325 MG
TABLET, DELAYED RELEASE (ENTERIC COATED) ORAL
Qty: 30 TABLET | Refills: 0 | Status: SHIPPED | OUTPATIENT
Start: 2017-06-30 | End: 2017-08-20 | Stop reason: SDUPTHER

## 2017-07-10 ENCOUNTER — TELEPHONE (OUTPATIENT)
Dept: NEPHROLOGY | Facility: CLINIC | Age: 82
End: 2017-07-10

## 2017-07-10 NOTE — TELEPHONE ENCOUNTER
Returned call to pt.'s daughter rescheduled to 8/3 at 2:30 as pt. Can not make appt. On 8/2 at 1pm.

## 2017-07-10 NOTE — TELEPHONE ENCOUNTER
----- Message from Mili Aguilar sent at 7/10/2017 11:51 AM CDT -----  Contact: Abbey Jesus, Daughter [phone]  She would like to reschedule appt for something after 3 pm.

## 2017-08-03 ENCOUNTER — OFFICE VISIT (OUTPATIENT)
Dept: NEPHROLOGY | Facility: CLINIC | Age: 82
End: 2017-08-03
Payer: MEDICARE

## 2017-08-03 ENCOUNTER — LAB VISIT (OUTPATIENT)
Dept: LAB | Facility: HOSPITAL | Age: 82
End: 2017-08-03
Attending: INTERNAL MEDICINE
Payer: MEDICARE

## 2017-08-03 VITALS
DIASTOLIC BLOOD PRESSURE: 64 MMHG | WEIGHT: 198.88 LBS | HEIGHT: 62 IN | SYSTOLIC BLOOD PRESSURE: 135 MMHG | HEART RATE: 62 BPM | BODY MASS INDEX: 36.6 KG/M2

## 2017-08-03 DIAGNOSIS — R60.0 LOCALIZED EDEMA: ICD-10-CM

## 2017-08-03 DIAGNOSIS — N18.4 CKD (CHRONIC KIDNEY DISEASE), STAGE IV: Primary | ICD-10-CM

## 2017-08-03 DIAGNOSIS — C61 PROSTATE CANCER: ICD-10-CM

## 2017-08-03 DIAGNOSIS — I70.1 ATHEROSCLEROTIC RAS (RENAL ARTERY STENOSIS), BILATERAL: ICD-10-CM

## 2017-08-03 DIAGNOSIS — N18.4 CKD (CHRONIC KIDNEY DISEASE) STAGE 4, GFR 15-29 ML/MIN: ICD-10-CM

## 2017-08-03 DIAGNOSIS — I10 ESSENTIAL HYPERTENSION: ICD-10-CM

## 2017-08-03 DIAGNOSIS — N18.4 CKD (CHRONIC KIDNEY DISEASE), STAGE IV: ICD-10-CM

## 2017-08-03 DIAGNOSIS — N17.9 AKI (ACUTE KIDNEY INJURY): Primary | ICD-10-CM

## 2017-08-03 LAB
ALBUMIN SERPL BCP-MCNC: 4 G/DL
ANION GAP SERPL CALC-SCNC: 11 MMOL/L
BUN SERPL-MCNC: 55 MG/DL
CALCIUM SERPL-MCNC: 9.5 MG/DL
CHLORIDE SERPL-SCNC: 100 MMOL/L
CO2 SERPL-SCNC: 28 MMOL/L
CREAT SERPL-MCNC: 4.7 MG/DL
EST. GFR  (AFRICAN AMERICAN): 13 ML/MIN/1.73 M^2
EST. GFR  (NON AFRICAN AMERICAN): 11 ML/MIN/1.73 M^2
GLUCOSE SERPL-MCNC: 79 MG/DL
PHOSPHATE SERPL-MCNC: 4.7 MG/DL
POTASSIUM SERPL-SCNC: 4.2 MMOL/L
SODIUM SERPL-SCNC: 139 MMOL/L

## 2017-08-03 PROCEDURE — 80069 RENAL FUNCTION PANEL: CPT

## 2017-08-03 PROCEDURE — 1126F AMNT PAIN NOTED NONE PRSNT: CPT | Mod: ,,, | Performed by: INTERNAL MEDICINE

## 2017-08-03 PROCEDURE — 36415 COLL VENOUS BLD VENIPUNCTURE: CPT

## 2017-08-03 PROCEDURE — 99214 OFFICE O/P EST MOD 30 MIN: CPT | Mod: S$PBB,,, | Performed by: INTERNAL MEDICINE

## 2017-08-03 PROCEDURE — 99999 PR PBB SHADOW E&M-EST. PATIENT-LVL II: CPT | Mod: PBBFAC,,, | Performed by: INTERNAL MEDICINE

## 2017-08-03 PROCEDURE — 1159F MED LIST DOCD IN RCRD: CPT | Mod: ,,, | Performed by: INTERNAL MEDICINE

## 2017-08-03 RX ORDER — HYDRALAZINE HYDROCHLORIDE 50 MG/1
50 TABLET, FILM COATED ORAL EVERY 12 HOURS
Qty: 60 TABLET | Refills: 2 | Status: SHIPPED | OUTPATIENT
Start: 2017-08-03 | End: 2020-08-11 | Stop reason: SDUPTHER

## 2017-08-03 NOTE — PROGRESS NOTES
Subjective:       Patient ID: Pedro Pro is a 81 y.o.   male who presents for follow up  evaluation of Chronic Kidney Disease; Edema; and Hypertension    Edema   Pertinent negatives include no abdominal pain, arthralgias, chest pain, chills, congestion, coughing, diaphoresis, fatigue, fever, headaches, joint swelling, nausea, neck pain, rash or vomiting.   Hypertension   Pertinent negatives include no chest pain, headaches, neck pain, palpitations or shortness of breath.   Patient is a 79-year-old male from [facility]; does not like doctors was  diagnosed with type II diabetes in  ; was found to have a creatinine 1.8 ; work showed diabetic nephropathy.  He does not speak English he had a friend who was translating for him.  From the history of present illness I gather was that he has had high blood pressure and multiple family members with severe diabetes and one of the siblings  of kidney failure ; he never got himself checked out;    10/2014 had scalp shingles s/p pain and meds     2014 he had  angio for PAD for Dr. Garcia repeat on both legs in 2015 and 2015 s/p PTCA     3/2015 his creatinine went up to 2.8 with acute kidney injury ; stopped  his ACE inhibitor ;  prostate biopsy c/w Prostate cancer pending staging and treatment options     2015 ACE inhibitor continued to be stopped, creatinine came down to 2.1, added calcium channel blocker for blood pressure control and hydrochlorothiazide for blood pressure and edema    2015 comes back for follow-up creatinine is stable at 2.1; hydrochlorothiazide increased to 25 mg to help treat edema and hypertension    10/2015 Norvasc 10 mg increased by Dr. Garcia     3/2016 High PSA dr. Kamara bx proven prostate cancer : no Rx since PSA stable pending 2nd bx     2016 creatinine higher 2.5     2016 GFR 30 % pm cystatin C 2nd prostate Bx shows stable prostate cancer     2017 creatinine was 2.4 GFR unchanged from 30%.   "Norvasc.  Since the patient was on Norvasc and Procardia.  Hydrocodone thiazide added for edema.  Cardura 4 mg added for blood pressure control      June 2017 patient comes back for follow-up with a creatinine of 3.1, approximately GFR 20-25%.  Swelling of the legs and eyes.  Status post ophthalmology appointment in May 2017 showing no retinopathy.  Due to severe swelling of the legs we stopped the Procardia.  Acute kidney injury was attributed to over medications because of combination of Procardia and Norvasc.  6/21/17 creatinine down to 2.6 off Procardia     Review of Systems   Constitutional: Negative.  Negative for activity change, appetite change, chills, diaphoresis, fatigue and fever.   HENT: Negative.  Negative for congestion and trouble swallowing.    Eyes: Negative.    Respiratory: Negative.  Negative for cough, chest tightness, shortness of breath and wheezing.    Cardiovascular: Positive for leg swelling. Negative for chest pain and palpitations.   Gastrointestinal: Negative.  Negative for abdominal distention, abdominal pain, nausea and vomiting.   Genitourinary: Negative.  Negative for decreased urine volume, difficulty urinating, dysuria, enuresis, flank pain, frequency, hematuria, penile swelling, scrotal swelling and urgency.   Musculoskeletal: Negative.  Negative for arthralgias, back pain, joint swelling and neck pain.   Skin: Negative for rash.   Neurological: Negative for tremors, seizures and headaches.        He has numbness and tingling in both feet occasional pain at nighttime   Psychiatric/Behavioral: Negative.  Negative for confusion and sleep disturbance. The patient is not nervous/anxious.        Objective:       Vitals:    08/03/17 1431   BP: 135/64   Pulse: 62   Weight: 90.2 kg (198 lb 13.7 oz)   Height: 5' 2" (1.575 m)     Weight loss 10 ib ( 210 ) 3/2017    Wt loss 16 ib (200) 6/2017       Physical Exam      Constitutional: He is oriented to person, place, and time. He appears " well-developed and well-nourished. No distress.   HENT:   Head: Normocephalic.   Eyes: Conjunctivae and EOM are normal. Pupils are equal, round, and reactive to light. No scleral icterus.   Neck: Normal range of motion. Neck supple. No JVD present. No thyromegaly present.   Cardiovascular: Normal rate, regular rhythm, normal heart sounds and intact distal pulses. Exam reveals no gallop and no friction rub.   No murmur heard.  Loud P2   Pulmonary/Chest: Effort normal and breath sounds normal. No stridor. No respiratory distress. He has no wheezes. He has no rales. He exhibits no tenderness.   Abdominal: Soft. Bowel sounds are normal. He exhibits no distension and no mass. There is no tenderness. There is no rebound and no guarding.   morbid obesity; positive truncal obesity   Musculoskeletal: Normal range of motion. He exhibits 1 + edema now .   Cannot rule out cholesterol emboli   Neurological: He is alert and oriented to person, place, and time. He has normal reflexes. No cranial nerve deficit. He exhibits normal muscle tone. Coordination normal.   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 No rash noted. He is not diaphoretic. No erythema. No pallor.   Cholesterol emboli in Feet bilaterally    Psychiatric: He has a normal mood and affect. His behavior is normal. Judgment and thought content normal.       Lab Results   Component Value Date    CREATININE 2.6 (H) 06/21/2017    BUN 42 (H) 06/21/2017     06/21/2017    K 4.0 06/21/2017     06/21/2017    CO2 27 06/21/2017     Lab Results   Component Value Date    WBC 7.28 06/12/2017    HGB 11.2 (L) 06/12/2017    HCT 34.2 (L) 06/12/2017    MCV 92 06/12/2017     06/12/2017     Lab Results   Component Value Date    PTH 48.0 06/12/2017    CALCIUM 9.4 06/21/2017    PHOS 3.5 06/21/2017     Lab Results   Component Value Date    URICACID 8.1 (H) 12/01/2014        no proetinuria       Cystatin C  GFR 30 % 12/2016 on Creatinine of 2.5      Assessment:       1. CARMEN (acute kidney injury)    2. CKD (chronic kidney disease) stage 4, GFR 15-29 ml/min    3. Localized edema    4. Atherosclerotic AUGUSTUS (renal artery stenosis), bilateral     5. Essential hypertension    6. Prostate cancer        Plan:         CARMEN: ? Etiology. Severe leg swelling improved off procardia.  DC Procardia now continue with Norvasc. Recheck Blood today     1.  Chronic kidney disease stage III/ IV : likely complications of Dye and cholesterol emboli ;  Keep plavix for now ; no heavy proteinuria; GFR 25-30 % ; no ACEI for now ; PTH is normal     2.  Hypertension: dr. Garcia changed medicines:well controlled today target blood pressure 130/80;  Norvasc 10 mg ; hydralazine 50 twice daily ; bystolic 10 mg ( HR 60) ;  cardura 4 mg ;     3.  Diabetic retinopathy:  eye examination with ophthalmologist yearly ; has had history of low sugars but none below 70      4.  Carotid bruit: USD -gualberto  carotid stenosis start him on aspirin once a day for stroke prevention    5. Edema in LE Brown: on cardura and Norvasc off procardia :KCL 20 torsemide 20 mg and follow up in 4 weeks     6. Cholesterol emboli of LE : keep plavix     7. Prostate cancer:  High PSA s/p  second Bx follow up with Dr. Mcmahan.     All plans d/w      For contact call : Abbey Edin [phone]         Follow up 2 months    Cc Dr. Angel Palacios; dr. Garcia     08/04/2017      Lab Results   Component Value Date    CREATININE 4.7 (H) 08/03/2017    BUN 55 (H) 08/03/2017     08/03/2017    K 4.2 08/03/2017     08/03/2017    CO2 28 08/03/2017     CARMEN : no reversible cause. Need to repeat USD to check for hydronephrosis: tried to contact Ms. Jesus    Arrange follow up in 1-2 weeks with nephrology and repeat labs     Markus Ponce MD

## 2017-08-04 ENCOUNTER — TELEPHONE (OUTPATIENT)
Dept: NEPHROLOGY | Facility: CLINIC | Age: 82
End: 2017-08-04

## 2017-08-04 NOTE — TELEPHONE ENCOUNTER
Tried to contact Mrs. Rodgers Duncan, emergency contact but unable to reach. Left voice message. Mrs. Jesus is not one pt chart as contact anymore.

## 2017-08-04 NOTE — TELEPHONE ENCOUNTER
----- Message from Markus Ponce MD sent at 8/4/2017  8:13 AM CDT -----    CARMEN : no reversible cause. Need to repeat USD to check for hydronephrosis: tried to contact Ms. Jesus    Arrange follow up in 1-2 weeks with nephrology and repeat labs

## 2017-08-07 ENCOUNTER — TELEPHONE (OUTPATIENT)
Dept: NEPHROLOGY | Facility: CLINIC | Age: 82
End: 2017-08-07

## 2017-08-07 NOTE — TELEPHONE ENCOUNTER
----- Message from Markus Ponce MD sent at 8/7/2017  7:19 AM CDT -----  CARMEN : no reversible cause. Need to repeat USD to check for hydronephrosis: tried to contact Ms. Jesus     Arrange follow up in 1-2 weeks with nephrology and repeat labs    Use  to talk to patient to accomplish repeat ultrasound    Ask him to cut torsemide 20 mg to take either half daily or one every other day and     Send him a letter of appointment for labs and follow up in [place] anyway     Thanks    NK

## 2017-08-07 NOTE — TELEPHONE ENCOUNTER
Tried calling patient several times on all the numbers provided and no answer and not able to leave a message.

## 2017-08-20 RX ORDER — ASPIRIN 325 MG
TABLET, DELAYED RELEASE (ENTERIC COATED) ORAL
Qty: 30 TABLET | Refills: 0 | Status: SHIPPED | OUTPATIENT
Start: 2017-08-20 | End: 2017-09-17 | Stop reason: SDUPTHER

## 2017-09-17 RX ORDER — ASPIRIN 325 MG
TABLET, DELAYED RELEASE (ENTERIC COATED) ORAL
Qty: 30 TABLET | Refills: 0 | Status: SHIPPED | OUTPATIENT
Start: 2017-09-17 | End: 2017-10-15 | Stop reason: SDUPTHER

## 2017-09-18 RX ORDER — ASPIRIN 325 MG
TABLET, DELAYED RELEASE (ENTERIC COATED) ORAL
Qty: 30 TABLET | Refills: 0 | Status: SHIPPED | OUTPATIENT
Start: 2017-09-18 | End: 2019-06-20 | Stop reason: DRUGHIGH

## 2017-10-15 RX ORDER — ASPIRIN 325 MG
TABLET, DELAYED RELEASE (ENTERIC COATED) ORAL
Qty: 30 TABLET | Refills: 0 | Status: SHIPPED | OUTPATIENT
Start: 2017-10-15 | End: 2017-11-13 | Stop reason: SDUPTHER

## 2017-10-18 ENCOUNTER — LAB VISIT (OUTPATIENT)
Dept: LAB | Facility: HOSPITAL | Age: 82
End: 2017-10-18
Attending: INTERNAL MEDICINE
Payer: MEDICARE

## 2017-10-18 DIAGNOSIS — R60.0 LOCALIZED EDEMA: ICD-10-CM

## 2017-10-18 DIAGNOSIS — I10 ESSENTIAL HYPERTENSION: ICD-10-CM

## 2017-10-18 DIAGNOSIS — N18.4 CKD (CHRONIC KIDNEY DISEASE) STAGE 4, GFR 15-29 ML/MIN: ICD-10-CM

## 2017-10-18 DIAGNOSIS — I70.1 ATHEROSCLEROTIC RAS (RENAL ARTERY STENOSIS), BILATERAL: ICD-10-CM

## 2017-10-18 LAB
ALBUMIN SERPL BCP-MCNC: 3.8 G/DL
ANION GAP SERPL CALC-SCNC: 12 MMOL/L
BASOPHILS # BLD AUTO: 0.06 K/UL
BASOPHILS NFR BLD: 0.8 %
BUN SERPL-MCNC: 42 MG/DL
CALCIUM SERPL-MCNC: 9.4 MG/DL
CHLORIDE SERPL-SCNC: 101 MMOL/L
CO2 SERPL-SCNC: 27 MMOL/L
CREAT SERPL-MCNC: 3.2 MG/DL
DIFFERENTIAL METHOD: ABNORMAL
EOSINOPHIL # BLD AUTO: 1 K/UL
EOSINOPHIL NFR BLD: 13.2 %
ERYTHROCYTE [DISTWIDTH] IN BLOOD BY AUTOMATED COUNT: 14.2 %
EST. GFR  (AFRICAN AMERICAN): 19.9 ML/MIN/1.73 M^2
EST. GFR  (NON AFRICAN AMERICAN): 17.2 ML/MIN/1.73 M^2
GLUCOSE SERPL-MCNC: 71 MG/DL
HCT VFR BLD AUTO: 36.2 %
HGB BLD-MCNC: 11.8 G/DL
IMM GRANULOCYTES # BLD AUTO: 0.04 K/UL
IMM GRANULOCYTES NFR BLD AUTO: 0.5 %
LYMPHOCYTES # BLD AUTO: 1.7 K/UL
LYMPHOCYTES NFR BLD: 21.7 %
MCH RBC QN AUTO: 31 PG
MCHC RBC AUTO-ENTMCNC: 32.6 G/DL
MCV RBC AUTO: 95 FL
MONOCYTES # BLD AUTO: 0.6 K/UL
MONOCYTES NFR BLD: 7.6 %
NEUTROPHILS # BLD AUTO: 4.4 K/UL
NEUTROPHILS NFR BLD: 56.2 %
NRBC BLD-RTO: 0 /100 WBC
PHOSPHATE SERPL-MCNC: 3.6 MG/DL
PLATELET # BLD AUTO: 286 K/UL
PMV BLD AUTO: 10.5 FL
POTASSIUM SERPL-SCNC: 4 MMOL/L
PTH-INTACT SERPL-MCNC: 40 PG/ML
RBC # BLD AUTO: 3.81 M/UL
SODIUM SERPL-SCNC: 140 MMOL/L
WBC # BLD AUTO: 7.87 K/UL

## 2017-10-18 PROCEDURE — 83970 ASSAY OF PARATHORMONE: CPT

## 2017-10-18 PROCEDURE — 85025 COMPLETE CBC W/AUTO DIFF WBC: CPT

## 2017-10-18 PROCEDURE — 36415 COLL VENOUS BLD VENIPUNCTURE: CPT

## 2017-10-18 PROCEDURE — 80069 RENAL FUNCTION PANEL: CPT

## 2017-10-25 ENCOUNTER — OFFICE VISIT (OUTPATIENT)
Dept: NEPHROLOGY | Facility: CLINIC | Age: 82
End: 2017-10-25
Payer: MEDICARE

## 2017-10-25 VITALS
BODY MASS INDEX: 34.18 KG/M2 | WEIGHT: 200.19 LBS | HEIGHT: 64 IN | HEART RATE: 60 BPM | DIASTOLIC BLOOD PRESSURE: 54 MMHG | SYSTOLIC BLOOD PRESSURE: 132 MMHG

## 2017-10-25 DIAGNOSIS — R60.0 LOCALIZED EDEMA: ICD-10-CM

## 2017-10-25 DIAGNOSIS — E55.9 VITAMIN D DEFICIENCY: ICD-10-CM

## 2017-10-25 DIAGNOSIS — N17.9 AKI (ACUTE KIDNEY INJURY): Primary | ICD-10-CM

## 2017-10-25 DIAGNOSIS — I10 ESSENTIAL HYPERTENSION: ICD-10-CM

## 2017-10-25 DIAGNOSIS — I70.1 ATHEROSCLEROTIC RAS (RENAL ARTERY STENOSIS), BILATERAL: ICD-10-CM

## 2017-10-25 DIAGNOSIS — N18.4 CKD (CHRONIC KIDNEY DISEASE), STAGE IV: ICD-10-CM

## 2017-10-25 PROCEDURE — 99999 PR PBB SHADOW E&M-EST. PATIENT-LVL III: CPT | Mod: PBBFAC,,, | Performed by: INTERNAL MEDICINE

## 2017-10-25 PROCEDURE — 99213 OFFICE O/P EST LOW 20 MIN: CPT | Mod: PBBFAC | Performed by: INTERNAL MEDICINE

## 2017-10-25 PROCEDURE — 99214 OFFICE O/P EST MOD 30 MIN: CPT | Mod: S$PBB,,, | Performed by: INTERNAL MEDICINE

## 2017-10-25 RX ORDER — ERGOCALCIFEROL 1.25 MG/1
50000 CAPSULE ORAL
Qty: 12 CAPSULE | Refills: 5 | Status: SHIPPED | OUTPATIENT
Start: 2017-10-25 | End: 2018-01-23

## 2017-10-25 RX ORDER — POTASSIUM CHLORIDE 20 MEQ/1
20 TABLET, EXTENDED RELEASE ORAL 2 TIMES DAILY
COMMUNITY
End: 2018-03-28 | Stop reason: SDUPTHER

## 2017-10-25 NOTE — PROGRESS NOTES
Subjective:       Patient ID: Pedro Pro is a 81 y.o.   male who presents for follow up  evaluation of Chronic Kidney Disease; Edema; Hypertension; and Acute Renal Failure    Edema   Pertinent negatives include no abdominal pain, arthralgias, chest pain, chills, congestion, coughing, diaphoresis, fatigue, fever, headaches, joint swelling, nausea, neck pain, rash or vomiting.   Hypertension   Pertinent negatives include no chest pain, headaches, neck pain, palpitations or shortness of breath.   Patient is a 79-year-old male from [facility]; does not like doctors was  diagnosed with type II diabetes in  ; was found to have a creatinine 1.8 ; work showed diabetic nephropathy.  He does not speak English he had a friend who was translating for him.  From the history of present illness I gather was that he has had high blood pressure and multiple family members with severe diabetes and one of the siblings  of kidney failure ; he never got himself checked out;    10/2014 had scalp shingles s/p pain and meds     2014 he had  angio for PAD for Dr. Garcia repeat on both legs in 2015 and 2015 s/p PTCA     3/2015 his creatinine went up to 2.8 with acute kidney injury ; stopped  his ACE inhibitor ;  prostate biopsy c/w Prostate cancer pending staging and treatment options     2015 ACE inhibitor continued to be stopped, creatinine came down to 2.1, added calcium channel blocker for blood pressure control and hydrochlorothiazide for blood pressure and edema    2015 comes back for follow-up creatinine is stable at 2.1; hydrochlorothiazide increased to 25 mg to help treat edema and hypertension    10/2015 Norvasc 10 mg increased by Dr. Garcia     3/2016 High PSA dr. Kamara bx proven prostate cancer : no Rx since PSA stable pending 2nd bx     2016 creatinine higher 2.5     2016 GFR 30 % pm cystatin C 2nd prostate Bx shows stable prostate cancer     2017 creatinine was 2.4 GFR  "unchanged from 30%.  Norvasc.  Since the patient was on Norvasc and Procardia.  Hydrocodone thiazide added for edema.  Cardura 4 mg added for blood pressure control      June 2017 patient comes back for follow-up with a creatinine of 3.1, approximately GFR 20-25%.  Swelling of the legs and eyes.  Status post ophthalmology appointment in May 2017 showing no retinopathy.  Due to severe swelling of the legs we stopped the Procardia.  Acute kidney injury was attributed to over medications because of combination of Procardia and Norvasc.  6/21/17 creatinine down to 2.6 off Procardia     Review of Systems   Constitutional: Negative.  Negative for activity change, appetite change, chills, diaphoresis, fatigue and fever.   HENT: Negative.  Negative for congestion and trouble swallowing.    Eyes: Negative.    Respiratory: Negative.  Negative for cough, chest tightness, shortness of breath and wheezing.    Cardiovascular: Positive for leg swelling. Negative for chest pain and palpitations.   Gastrointestinal: Negative.  Negative for abdominal distention, abdominal pain, nausea and vomiting.   Genitourinary: Negative.  Negative for decreased urine volume, difficulty urinating, dysuria, enuresis, flank pain, frequency, hematuria, penile swelling, scrotal swelling and urgency.   Musculoskeletal: Negative.  Negative for arthralgias, back pain, joint swelling and neck pain.   Skin: Negative for rash.   Neurological: Negative for tremors, seizures and headaches.        He has numbness and tingling in both feet occasional pain at nighttime   Psychiatric/Behavioral: Negative.  Negative for confusion and sleep disturbance. The patient is not nervous/anxious.        Objective:       Vitals:    10/25/17 1403   BP: (!) 132/54   Pulse: 60   Weight: 90.8 kg (200 lb 2.8 oz)   Height: 5' 4" (1.626 m)     Weight loss 10 ib ( 210 ) 3/2017    Wt loss 16 ib (200) 6/2017     10/2017 stable weight   Physical Exam      Constitutional: He is " oriented to person, place, and time. He appears well-developed and well-nourished. No distress.   HENT:   Head: Normocephalic.   Eyes: Conjunctivae and EOM are normal. Pupils are equal, round, and reactive to light. No scleral icterus.   Neck: Normal range of motion. Neck supple. No JVD present. No thyromegaly present.   Cardiovascular: Normal rate, regular rhythm, normal heart sounds and intact distal pulses. Exam reveals no gallop and no friction rub.   No murmur heard.  Loud P2   Pulmonary/Chest: Effort normal and breath sounds normal. No stridor. No respiratory distress. He has no wheezes. He has no rales. He exhibits no tenderness.   Abdominal: Soft. Bowel sounds are normal. He exhibits no distension and no mass. There is no tenderness. There is no rebound and no guarding.   morbid obesity; positive truncal obesity   Musculoskeletal: Normal range of motion. He exhibits 1 + edema improved   Cannot rule out cholesterol emboli   Neurological: He is alert and oriented to person, place, and time. He has normal reflexes. No cranial nerve deficit. He exhibits normal muscle tone. Coordination normal.   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 No rash noted. He is not diaphoretic. No erythema. No pallor.   Cholesterol emboli in Feet bilaterally    Psychiatric: He has a normal mood and affect. His behavior is normal. Judgment and thought content normal.       Lab Results   Component Value Date    CREATININE 3.2 (H) 10/18/2017    BUN 42 (H) 10/18/2017     10/18/2017    K 4.0 10/18/2017     10/18/2017    CO2 27 10/18/2017     Lab Results   Component Value Date    WBC 7.87 10/18/2017    HGB 11.8 (L) 10/18/2017    HCT 36.2 (L) 10/18/2017    MCV 95 10/18/2017     10/18/2017     Lab Results   Component Value Date    PTH 40.0 10/18/2017    CALCIUM 9.4 10/18/2017    PHOS 3.6 10/18/2017     Lab Results   Component Value Date    URICACID 8.1 (H)  12/01/2014        no proetinuria       Cystatin C GFR 30 % 12/2016 on Creatinine of 2.5      Assessment:       1. CARMEN (acute kidney injury)    2. CKD (chronic kidney disease), stage IV    3. Localized edema    4. Atherosclerotic AUGUSTUS (renal artery stenosis), bilateral     5. Essential hypertension        Plan:         CARMEN: ? Etiology. Severe leg swelling improved off procardia.  Improved off Procardia    1.  Chronic kidney disease stage IV : likely complications of Dye and cholesterol emboli ;  Keep plavix for now ; no heavy proteinuria; GFR 20-25 % ; no ACEI for now ; PTH is normal. Options material given about HD /PD options     2.  Hypertension: dr. Garcia :well controlled today target blood pressure 130/80    3.  Diabetic retinopathy:  eye examination with ophthalmologist yearly ; has had history of low sugars but none below 70      4.  Carotid bruit: USD -gualberto  carotid stenosis: on aspirin once a day for stroke prevention    5. Edema in LE [place]: on cardura and Norvasc off procardia :KCL 20 torsemide 20 mg doing well     6. Vit D def: level 25 in 2016 start Vit D weekly     7. Prostate cancer:  High PSA s/p  second Bx follow up with Dr. Mcmahan 1/2018     All plans d/w      For contact call : Abbey Jesus [phone]         Follow up 3 months    Cc Dr. Angel Palacios; dr. Jose Ponce MD

## 2017-10-25 NOTE — PATIENT INSTRUCTIONS
CARMEN: ? Etiology. Severe leg swelling improved off procardia.  Improved off Procardia    1.  Chronic kidney disease stage IV : likely complications of Dye and cholesterol emboli ;  Keep plavix for now ; no heavy proteinuria; GFR 20-25 % ; no ACEI for now ; PTH is normal. Options material given about HD /PD options     2.  Hypertension: dr. Garcia :well controlled today target blood pressure 130/80    3.  Diabetic retinopathy:  eye examination with ophthalmologist yearly ; has had history of low sugars but none below 70      4.  Carotid bruit: USD -gualberto  carotid stenosis: on aspirin once a day for stroke prevention    5. Edema in LE [place]: on cardura and Norvasc off procardia :KCL 20 torsemide 20 mg doing well     6. Vit D def: level 25 in 2016 start Vit D weekly     7. Prostate cancer:  High PSA s/p  second Bx follow up with Dr. Mcmahan 1/2018     All plans d/w

## 2017-11-13 RX ORDER — ASPIRIN 325 MG
TABLET, DELAYED RELEASE (ENTERIC COATED) ORAL
Qty: 30 TABLET | Refills: 0 | Status: SHIPPED | OUTPATIENT
Start: 2017-11-13 | End: 2017-12-10 | Stop reason: SDUPTHER

## 2017-12-10 RX ORDER — ASPIRIN 325 MG
TABLET, DELAYED RELEASE (ENTERIC COATED) ORAL
Qty: 30 TABLET | Refills: 0 | Status: SHIPPED | OUTPATIENT
Start: 2017-12-10 | End: 2018-01-08 | Stop reason: SDUPTHER

## 2018-01-08 RX ORDER — ASPIRIN 325 MG
TABLET, DELAYED RELEASE (ENTERIC COATED) ORAL
Qty: 30 TABLET | Refills: 0 | Status: SHIPPED | OUTPATIENT
Start: 2018-01-08 | End: 2019-01-03

## 2018-01-22 ENCOUNTER — LAB VISIT (OUTPATIENT)
Dept: LAB | Facility: HOSPITAL | Age: 83
End: 2018-01-22
Attending: INTERNAL MEDICINE
Payer: MEDICARE

## 2018-01-22 DIAGNOSIS — N18.4 CKD (CHRONIC KIDNEY DISEASE), STAGE IV: ICD-10-CM

## 2018-01-22 DIAGNOSIS — R60.0 LOCALIZED EDEMA: ICD-10-CM

## 2018-01-22 DIAGNOSIS — I10 ESSENTIAL HYPERTENSION: ICD-10-CM

## 2018-01-22 DIAGNOSIS — N17.9 AKI (ACUTE KIDNEY INJURY): ICD-10-CM

## 2018-01-22 DIAGNOSIS — I70.1 ATHEROSCLEROTIC RAS (RENAL ARTERY STENOSIS), BILATERAL: ICD-10-CM

## 2018-01-22 LAB
ALBUMIN SERPL BCP-MCNC: 3.9 G/DL
ANION GAP SERPL CALC-SCNC: 11 MMOL/L
BASOPHILS # BLD AUTO: 0.06 K/UL
BASOPHILS NFR BLD: 0.8 %
BUN SERPL-MCNC: 36 MG/DL
CALCIUM SERPL-MCNC: 9.4 MG/DL
CHLORIDE SERPL-SCNC: 98 MMOL/L
CO2 SERPL-SCNC: 31 MMOL/L
CREAT SERPL-MCNC: 2.3 MG/DL
DIFFERENTIAL METHOD: ABNORMAL
EOSINOPHIL # BLD AUTO: 0.7 K/UL
EOSINOPHIL NFR BLD: 9.8 %
ERYTHROCYTE [DISTWIDTH] IN BLOOD BY AUTOMATED COUNT: 12.9 %
EST. GFR  (AFRICAN AMERICAN): 29.5 ML/MIN/1.73 M^2
EST. GFR  (NON AFRICAN AMERICAN): 25.5 ML/MIN/1.73 M^2
GLUCOSE SERPL-MCNC: 117 MG/DL
HCT VFR BLD AUTO: 37.2 %
HGB BLD-MCNC: 12.6 G/DL
IMM GRANULOCYTES # BLD AUTO: 0.04 K/UL
IMM GRANULOCYTES NFR BLD AUTO: 0.6 %
LYMPHOCYTES # BLD AUTO: 1.7 K/UL
LYMPHOCYTES NFR BLD: 23.3 %
MCH RBC QN AUTO: 31.3 PG
MCHC RBC AUTO-ENTMCNC: 33.9 G/DL
MCV RBC AUTO: 93 FL
MONOCYTES # BLD AUTO: 0.6 K/UL
MONOCYTES NFR BLD: 8.7 %
NEUTROPHILS # BLD AUTO: 4.1 K/UL
NEUTROPHILS NFR BLD: 56.8 %
NRBC BLD-RTO: 0 /100 WBC
PHOSPHATE SERPL-MCNC: 4 MG/DL
PLATELET # BLD AUTO: 268 K/UL
PMV BLD AUTO: 10.3 FL
POTASSIUM SERPL-SCNC: 3.8 MMOL/L
PTH-INTACT SERPL-MCNC: 64 PG/ML
RBC # BLD AUTO: 4.02 M/UL
SODIUM SERPL-SCNC: 140 MMOL/L
WBC # BLD AUTO: 7.24 K/UL

## 2018-01-22 PROCEDURE — 83970 ASSAY OF PARATHORMONE: CPT

## 2018-01-22 PROCEDURE — 36415 COLL VENOUS BLD VENIPUNCTURE: CPT

## 2018-01-22 PROCEDURE — 80069 RENAL FUNCTION PANEL: CPT

## 2018-01-22 PROCEDURE — 85025 COMPLETE CBC W/AUTO DIFF WBC: CPT

## 2018-01-23 LAB
CYSTATIN C SERPL-MCNC: 2.08 MG/L
GFR/BSA.PRED SERPLBLD CYS-BASED-ARV: 27 ML/MIN/BSA

## 2018-01-29 ENCOUNTER — OFFICE VISIT (OUTPATIENT)
Dept: NEPHROLOGY | Facility: CLINIC | Age: 83
End: 2018-01-29
Payer: MEDICARE

## 2018-01-29 VITALS
HEART RATE: 64 BPM | SYSTOLIC BLOOD PRESSURE: 138 MMHG | WEIGHT: 201.25 LBS | DIASTOLIC BLOOD PRESSURE: 60 MMHG | BODY MASS INDEX: 34.36 KG/M2 | HEIGHT: 64 IN

## 2018-01-29 DIAGNOSIS — E55.9 VITAMIN D DEFICIENCY: ICD-10-CM

## 2018-01-29 DIAGNOSIS — R60.0 LOCALIZED EDEMA: ICD-10-CM

## 2018-01-29 DIAGNOSIS — N18.4 CKD (CHRONIC KIDNEY DISEASE), STAGE IV: ICD-10-CM

## 2018-01-29 DIAGNOSIS — I10 ESSENTIAL HYPERTENSION: ICD-10-CM

## 2018-01-29 DIAGNOSIS — I70.1 ATHEROSCLEROTIC RAS (RENAL ARTERY STENOSIS), BILATERAL: ICD-10-CM

## 2018-01-29 DIAGNOSIS — N17.9 AKI (ACUTE KIDNEY INJURY): Primary | ICD-10-CM

## 2018-01-29 PROCEDURE — 99999 PR PBB SHADOW E&M-EST. PATIENT-LVL II: CPT | Mod: PBBFAC,,, | Performed by: INTERNAL MEDICINE

## 2018-01-29 PROCEDURE — 99212 OFFICE O/P EST SF 10 MIN: CPT | Mod: PBBFAC | Performed by: INTERNAL MEDICINE

## 2018-01-29 PROCEDURE — 99214 OFFICE O/P EST MOD 30 MIN: CPT | Mod: S$PBB,,, | Performed by: INTERNAL MEDICINE

## 2018-01-29 RX ORDER — ERGOCALCIFEROL 1.25 MG/1
50000 CAPSULE ORAL
COMMUNITY

## 2018-01-29 NOTE — PROGRESS NOTES
Subjective:       Patient ID: Pedro Pro is a 82 y.o.   male who presents for follow up  evaluation of Chronic Kidney Disease and Acute Renal Failure    Edema   Pertinent negatives include no abdominal pain, arthralgias, chest pain, chills, congestion, coughing, diaphoresis, fatigue, fever, headaches, joint swelling, nausea, neck pain, rash or vomiting.   Hypertension   Pertinent negatives include no chest pain, headaches, neck pain, palpitations or shortness of breath.   Patient is a 79-year-old male from [facility]; does not like doctors was  diagnosed with type II diabetes in  ; was found to have a creatinine 1.8 ; work showed diabetic nephropathy.  He does not speak English he had a friend who was translating for him.  From the history of present illness I gather was that he has had high blood pressure and multiple family members with severe diabetes and one of the siblings  of kidney failure ; he never got himself checked out;    10/2014 had scalp shingles s/p pain and meds     2014 he had  angio for PAD for Dr. Garcia repeat on both legs in 2015 and 2015 s/p PTCA     3/2015 his creatinine went up to 2.8 with acute kidney injury ; stopped  his ACE inhibitor ;  prostate biopsy c/w Prostate cancer pending staging and treatment options     2015 ACE inhibitor continued to be stopped, creatinine came down to 2.1, added calcium channel blocker for blood pressure control and hydrochlorothiazide for blood pressure and edema    2015 comes back for follow-up creatinine is stable at 2.1; hydrochlorothiazide increased to 25 mg to help treat edema and hypertension    10/2015 Norvasc 10 mg increased by Dr. Garcia     3/2016 High PSA dr. Kamara bx proven prostate cancer : no Rx since PSA stable pending 2nd bx     2016 creatinine higher 2.5     2016 GFR 30 % pm cystatin C 2nd prostate Bx shows stable prostate cancer     2017 creatinine was 2.4 GFR unchanged from 30%.   "Norvasc.  Since the patient was on Norvasc and Procardia.  Hydrocodone thiazide added for edema.  Cardura 4 mg added for blood pressure control      June 2017 patient comes back for follow-up with a creatinine of 3.1, approximately GFR 20-25%.  Swelling of the legs and eyes.  Status post ophthalmology appointment in May 2017 showing no retinopathy.  Due to severe swelling of the legs we stopped the Procardia.  Acute kidney injury was attributed to over medications because of combination of Procardia and Norvasc.  6/21/17 creatinine down to 2.6 off Procardia    1/2018 Cyndi is better ; GFR 27%      Review of Systems   Constitutional: Negative.  Negative for activity change, appetite change, chills, diaphoresis, fatigue and fever.   HENT: Negative.  Negative for congestion and trouble swallowing.    Eyes: Negative.    Respiratory: Negative.  Negative for cough, chest tightness, shortness of breath and wheezing.    Cardiovascular: Positive for leg swelling. Negative for chest pain and palpitations.   Gastrointestinal: Negative.  Negative for abdominal distention, abdominal pain, nausea and vomiting.   Genitourinary: Negative.  Negative for decreased urine volume, difficulty urinating, dysuria, enuresis, flank pain, frequency, hematuria, penile swelling, scrotal swelling and urgency.   Musculoskeletal: Negative.  Negative for arthralgias, back pain, joint swelling and neck pain.   Skin: Negative for rash.   Neurological: Negative for tremors, seizures and headaches.        He has numbness and tingling in both feet occasional pain at nighttime   Psychiatric/Behavioral: Negative.  Negative for confusion and sleep disturbance. The patient is not nervous/anxious.        Objective:       Vitals:    01/29/18 1327   BP: 138/60   Pulse: 64   Weight: 91.3 kg (201 lb 4.5 oz)   Height: 5' 4" (1.626 m)     Weight loss 10 ib ( 210 ) 3/2017    Wt loss 16 ib (200) 6/2017     10/2017 stable weight   Physical Exam      Constitutional: He " is oriented to person, place, and time. He appears well-developed and well-nourished. No distress.   HENT:   Head: Normocephalic.   Eyes: Conjunctivae and EOM are normal. Pupils are equal, round, and reactive to light. No scleral icterus.   Neck: Normal range of motion. Neck supple. No JVD present. No thyromegaly present.   Cardiovascular: Normal rate, regular rhythm, normal heart sounds and intact distal pulses. Exam reveals no gallop and no friction rub.   No murmur heard.  Loud P2   Pulmonary/Chest: Effort normal and breath sounds normal. No stridor. No respiratory distress. He has no wheezes. He has no rales. He exhibits no tenderness.   Abdominal: Soft. Bowel sounds are normal. He exhibits no distension and no mass. There is no tenderness. There is no rebound and no guarding.   morbid obesity; positive truncal obesity   Musculoskeletal: Normal range of motion. He exhibits trace edema improved   Cannot rule out cholesterol emboli   Neurological: He is alert and oriented to person, place, and time. He has normal reflexes. No cranial nerve deficit. He exhibits normal muscle tone. Coordination normal.   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 No rash noted. He is not diaphoretic. No erythema. No pallor.   Cholesterol emboli in Feet bilaterally    Psychiatric: He has a normal mood and affect. His behavior is normal. Judgment and thought content normal.       Lab Results   Component Value Date    CREATININE 2.3 (H) 01/22/2018    BUN 36 (H) 01/22/2018     01/22/2018    K 3.8 01/22/2018    CL 98 01/22/2018    CO2 31 (H) 01/22/2018     Lab Results   Component Value Date    WBC 7.24 01/22/2018    HGB 12.6 (L) 01/22/2018    HCT 37.2 (L) 01/22/2018    MCV 93 01/22/2018     01/22/2018     Lab Results   Component Value Date    PTH 64.0 01/22/2018    CALCIUM 9.4 01/22/2018    PHOS 4.0 01/22/2018     Lab Results   Component Value Date    URICACID  8.1 (H) 12/01/2014        no proetinuria       Cystatin C GFR 30 % 12/2016 on Creatinine of 2.5    1/2018 27%       Assessment:       1. CARMEN (acute kidney injury)    2. CKD (chronic kidney disease), stage IV    3. Localized edema    4. Atherosclerotic AUGUSTUS (renal artery stenosis), bilateral     5. Essential hypertension    6. Vitamin D deficiency        Plan:         CARMEN: ? Etiology. Severe leg swelling improved off procardia. Much better now       1.  Chronic kidney disease stage IV : likely complications of Dye and cholesterol emboli ;  Keep plavix for now ; no heavy proteinuria; GFR 27 % ; no ACEI for now ; PTH is normal.     2.  Hypertension: dr. Garcia :well controlled today target blood pressure 130/80    3.  Diabetic retinopathy:  eye examination with ophthalmologist yearly ; has had history of low sugars but none below 70      4.  Carotid bruit: USD -gualberto  carotid stenosis: on aspirin once a day for stroke prevention    5. Edema in LE [place]: on cardura and Norvasc off procardia :KCL 20 torsemide 20 mg doing well     6. Vit D def: level 25 in 2016 start Vit D weekly     7. Prostate cancer:  High PSA s/p  second Bx follow up with Dr. Mcmahan 1/2018 pending MRI     All plans d/w      For contact call : Abbey Jesus [phone]         Follow up 3 months    Cc Dr. Angel Palacios; dr. Jose Ponce MD

## 2018-03-20 RX ORDER — DOXAZOSIN 4 MG/1
4 TABLET ORAL NIGHTLY
Qty: 30 TABLET | Refills: 11 | Status: SHIPPED | OUTPATIENT
Start: 2018-03-20 | End: 2018-06-20 | Stop reason: SDUPTHER

## 2018-03-28 NOTE — TELEPHONE ENCOUNTER
----- Message from Sebas Gallo sent at 3/28/2018  3:19 PM CDT -----  Contact: tracy pt's daughter  1. What is the name of the medication you are requesting? Potassium Chloride  2. What is the dose? 20 mg  3. How do you take the medication? Orally, topically, etc? Orally  4. How often do you take this medication? Once daily  5. Do you need a 30 day or 90 day supply? 90  6. How many refills are you requesting? 4  7. What is your preferred pharmacy and location of the pharmacy? [facility] on [address]  8. Who can we contact with further questions? [phone] (ernt)

## 2018-03-28 NOTE — TELEPHONE ENCOUNTER
----- Message from Selena Child sent at 3/28/2018  1:00 PM CDT -----  [facility] a call from nurse to f/u on refill for pt potassium medication.      Please call pt back at [phone]

## 2018-03-29 RX ORDER — POTASSIUM CHLORIDE 20 MEQ/1
20 TABLET, EXTENDED RELEASE ORAL 2 TIMES DAILY
Qty: 180 TABLET | Refills: 3 | Status: SHIPPED | OUTPATIENT
Start: 2018-03-29 | End: 2019-05-07 | Stop reason: SDUPTHER

## 2018-04-02 ENCOUNTER — TELEPHONE (OUTPATIENT)
Dept: OPHTHALMOLOGY | Facility: CLINIC | Age: 83
End: 2018-04-02

## 2018-04-02 NOTE — TELEPHONE ENCOUNTER
----- Message from Lashawn Sanabria sent at 4/2/2018  4:12 PM CDT -----  Contact: Pt's Daughter  She is calling to schedule an appt for the pt due to he does not speak english.    She can be reached at [phone]    She is looking to schedule for Monday, Tuesday or Wednesday after 2pm

## 2018-05-07 ENCOUNTER — OFFICE VISIT (OUTPATIENT)
Dept: OPHTHALMOLOGY | Facility: CLINIC | Age: 83
End: 2018-05-07
Payer: MEDICARE

## 2018-05-07 DIAGNOSIS — H04.123 DRY EYES, BILATERAL: ICD-10-CM

## 2018-05-07 DIAGNOSIS — Z96.1 PSEUDOPHAKIA, BOTH EYES: ICD-10-CM

## 2018-05-07 DIAGNOSIS — E11.9 DIABETES MELLITUS TYPE 2 WITHOUT RETINOPATHY: Primary | ICD-10-CM

## 2018-05-07 DIAGNOSIS — H11.002 PTERYGIUM, LEFT: ICD-10-CM

## 2018-05-07 PROCEDURE — 99999 PR PBB SHADOW E&M-EST. PATIENT-LVL II: CPT | Mod: PBBFAC,,, | Performed by: OPHTHALMOLOGY

## 2018-05-07 PROCEDURE — 92014 COMPRE OPH EXAM EST PT 1/>: CPT | Mod: S$PBB,,, | Performed by: OPHTHALMOLOGY

## 2018-05-07 PROCEDURE — 99212 OFFICE O/P EST SF 10 MIN: CPT | Mod: PBBFAC,PO | Performed by: OPHTHALMOLOGY

## 2018-05-07 NOTE — PROGRESS NOTES
SUBJECTIVE:   Pedro Pro is a 82 y.o. male   Uncorrected distance visual acuity was 20/20 -1 in the right eye and 20/30 -1 in the left eye.   Chief Complaint   Patient presents with    Diabetic Eye Exam     pt states vision and BS doing great ou just burns every so often         HPI:  HPI     Diabetic Eye Exam    Additional comments: pt states vision and BS doing great ou just burns   every so often            Comments   PCP Dr. Gab Akers    1.PCIOL OD 2-5-14 LensX and arcuatesx2 were opened in Surgery  PCIOL OS 5-31-05 SA60AT +25.0 Dr Perez  2.DM x 2014  3. Dry Eyes  4. Pterygium OS    Refresh 3x a week       Last edited by Grisel Jurado MA on 5/7/2018  2:08 PM. (History)        Assessment /Plan :  1. Diabetes mellitus type 2 without retinopathy No diabetic retinopathy at this time. Reviewed diabetic eye precautions including avoiding tobacco use,  Good glucose control, and importance of regular follow up.      2. Pseudophakia, both eyes  -- Condition stable, no therapeutic change required. Monitoring routinely.     3. Pterygium, left monitor for now   4. Dry eyes, bilateral  -- Condition stable, no therapeutic change required. Monitoring routinely.       RTC in 1 year or prn any changes

## 2018-05-07 NOTE — LETTER
[facility] - Ophthalmology  [address]  [place] [address]  Phone: [phone]  Fax: [phone]   May 7, 2018    Gab Akers MD  [address]  [place] [address]    Patient: Pedro Pro   MR Number: [id number]   YOB: 1935   Date of Visit: 5/7/2018       Dear Dr. Akers :    Thank you for referring Pedro Pro to me for evaluation. Here is my assessment and plan of care:          1. Diabetes mellitus type 2 without retinopathy No diabetic retinopathy at this time. Reviewed diabetic eye precautions including avoiding tobacco use,  Good glucose control, and importance of regular follow up.      2. Pseudophakia, both eyes  -- Condition stable, no therapeutic change required. Monitoring routinely.     3. Pterygium, left monitor for now   4. Dry eyes, bilateral  -- Condition stable, no therapeutic change required. Monitoring routinely.       RTC in 1 year or prn any changes        If you have questions, please do not hesitate to call me. I look forward to following Mr. Pedro Pro along with you.    Sincerely,        Gab Cruz MD       CC  No Recipients

## 2018-05-21 ENCOUNTER — OFFICE VISIT (OUTPATIENT)
Dept: NEPHROLOGY | Facility: CLINIC | Age: 83
End: 2018-05-21
Payer: MEDICARE

## 2018-05-21 VITALS
WEIGHT: 203.5 LBS | HEIGHT: 64 IN | SYSTOLIC BLOOD PRESSURE: 112 MMHG | DIASTOLIC BLOOD PRESSURE: 50 MMHG | HEART RATE: 70 BPM | BODY MASS INDEX: 34.74 KG/M2 | OXYGEN SATURATION: 98 % | RESPIRATION RATE: 18 BRPM

## 2018-05-21 DIAGNOSIS — R60.0 LOCALIZED EDEMA: ICD-10-CM

## 2018-05-21 DIAGNOSIS — C61 PROSTATE CANCER: ICD-10-CM

## 2018-05-21 DIAGNOSIS — N17.9 AKI (ACUTE KIDNEY INJURY): ICD-10-CM

## 2018-05-21 DIAGNOSIS — N18.4 CKD (CHRONIC KIDNEY DISEASE), STAGE IV: Primary | ICD-10-CM

## 2018-05-21 DIAGNOSIS — I10 ESSENTIAL HYPERTENSION: ICD-10-CM

## 2018-05-21 DIAGNOSIS — I70.1 ATHEROSCLEROTIC RAS (RENAL ARTERY STENOSIS), BILATERAL: ICD-10-CM

## 2018-05-21 DIAGNOSIS — E55.9 VITAMIN D DEFICIENCY: ICD-10-CM

## 2018-05-21 PROCEDURE — 99213 OFFICE O/P EST LOW 20 MIN: CPT | Mod: PBBFAC | Performed by: INTERNAL MEDICINE

## 2018-05-21 PROCEDURE — 99215 OFFICE O/P EST HI 40 MIN: CPT | Mod: S$PBB,,, | Performed by: INTERNAL MEDICINE

## 2018-05-21 PROCEDURE — 99999 PR PBB SHADOW E&M-EST. PATIENT-LVL III: CPT | Mod: PBBFAC,,, | Performed by: INTERNAL MEDICINE

## 2018-05-21 NOTE — PROGRESS NOTES
Subjective:       Patient ID: Pedro Pro is a 82 y.o.   male who presents for follow up  evaluation of CARMEN    Edema   Pertinent negatives include no abdominal pain, arthralgias, chest pain, chills, congestion, coughing, diaphoresis, fatigue, fever, headaches, joint swelling, nausea, neck pain, rash or vomiting.   Hypertension   Pertinent negatives include no chest pain, headaches, neck pain, palpitations or shortness of breath.   Patient is a 79-year-old male from [facility]; does not like doctors was  diagnosed with type II diabetes in  ; was found to have a creatinine 1.8 ; work showed diabetic nephropathy.  He does not speak English he had a friend who was translating for him.  From the history of present illness I gather was that he has had high blood pressure and multiple family members with severe diabetes and one of the siblings  of kidney failure ; he never got himself checked out;    10/2014 had scalp shingles s/p pain and meds     2014 he had  angio for PAD for Dr. Garcia repeat on both legs in 2015 and 2015 s/p PTCA     3/2015 his creatinine went up to 2.8 with acute kidney injury ; stopped  his ACE inhibitor ;  prostate biopsy c/w Prostate cancer pending staging and treatment options     2015 ACE inhibitor continued to be stopped, creatinine came down to 2.1, added calcium channel blocker for blood pressure control and hydrochlorothiazide for blood pressure and edema    2015 comes back for follow-up creatinine is stable at 2.1; hydrochlorothiazide increased to 25 mg to help treat edema and hypertension    10/2015 Norvasc 10 mg increased by Dr. Garcia     3/2016 High PSA dr. Kamara bx proven prostate cancer : no Rx since PSA stable pending 2nd bx     2016 creatinine higher 2.5     2016 GFR 30 % pm cystatin C 2nd prostate Bx shows stable prostate cancer     2017 creatinine was 2.4 GFR unchanged from 30%.  Norvasc.  Since the patient was on Norvasc and  Procardia.  Hydrocodone thiazide added for edema.  Cardura 4 mg added for blood pressure control      June 2017 patient comes back for follow-up with a creatinine of 3.1, approximately GFR 20-25%.  Swelling of the legs and eyes.  Status post ophthalmology appointment in May 2017 showing no retinopathy.  Due to severe swelling of the legs we stopped the Procardia.  Acute kidney injury was attributed to over medications because of combination of Procardia and Norvasc.  6/21/17 creatinine down to 2.6 off Procardia    1/2018 Cyndi is better ; GFR 27%     5/2018 GI bleed Hemoglobin down to 4.4 s/p 4 units PRBC and GI EGD and colonoscopy pending capsule ( possibly AVM) ; due to CYNDI and low BP stopped Hydralazine /norvasc /cardura and torsemide      Review of Systems   Constitutional: Negative.  Negative for activity change, appetite change, chills, diaphoresis, fatigue and fever.   HENT: Negative.  Negative for congestion and trouble swallowing.    Eyes: Negative.    Respiratory: Negative.  Negative for cough, chest tightness, shortness of breath and wheezing.    Cardiovascular: Positive for leg swelling. Negative for chest pain and palpitations.   Gastrointestinal: Negative.  Negative for abdominal distention, abdominal pain, nausea and vomiting.   Genitourinary: Negative.  Negative for decreased urine volume, difficulty urinating, dysuria, enuresis, flank pain, frequency, hematuria, penile swelling, scrotal swelling and urgency.   Musculoskeletal: Negative.  Negative for arthralgias, back pain, joint swelling and neck pain.   Skin: Negative for rash.   Neurological: Negative for tremors, seizures and headaches.        He has numbness and tingling in both feet occasional pain at nighttime   Psychiatric/Behavioral: Negative.  Negative for confusion and sleep disturbance. The patient is not nervous/anxious.        Objective:       Vitals:    05/21/18 1123   BP: (!) 112/50   Pulse: 70   Resp: 18   SpO2: 98%   Weight: 92.3 kg  "(203 lb 7.8 oz)   Height: 5' 4" (1.626 m)     Weight loss 10 ib ( 210 ) 3/2017    Wt loss 16 ib (200) 6/2017     10/2017 stable weight   Physical Exam      Constitutional: He is oriented to person, place, and time. He appears well-developed and well-nourished. No distress.   HENT:   Head: Normocephalic.   Eyes: Conjunctivae and EOM are normal. Pupils are equal, round, and reactive to light. No scleral icterus.   Neck: Normal range of motion. Neck supple. No JVD present. No thyromegaly present.   Cardiovascular: Normal rate, regular rhythm, normal heart sounds and intact distal pulses. Exam reveals no gallop and no friction rub.   No murmur heard.  Loud P2   Pulmonary/Chest: Effort normal and breath sounds normal. No stridor. No respiratory distress. He has no wheezes. He has no rales. He exhibits no tenderness.   Abdominal: Soft. Bowel sounds are normal. He exhibits no distension and no mass. There is no tenderness. There is no rebound and no guarding.   morbid obesity; positive truncal obesity   Musculoskeletal: Normal range of motion. He exhibits trace edema improved   Cannot rule out cholesterol emboli   Neurological: He is alert and oriented to person, place, and time. He has normal reflexes. No cranial nerve deficit. He exhibits normal muscle tone. Coordination normal.   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 No rash noted. He is not diaphoretic. No erythema. No pallor.   Cholesterol emboli in Feet bilaterally    Psychiatric: He has a normal mood and affect. His behavior is normal. Judgment and thought content normal.       Lab Results   Component Value Date    CREATININE 2.3 (H) 01/22/2018    BUN 36 (H) 01/22/2018     01/22/2018    K 3.8 01/22/2018    CL 98 01/22/2018    CO2 31 (H) 01/22/2018     Lab Results   Component Value Date    WBC 7.24 01/22/2018    HGB 12.6 (L) 01/22/2018    HCT 37.2 (L) 01/22/2018    MCV 93 01/22/2018    PLT " 268 01/22/2018     Lab Results   Component Value Date    PTH 64.0 01/22/2018    CALCIUM 9.4 01/22/2018    PHOS 4.0 01/22/2018     Lab Results   Component Value Date    URICACID 8.1 (H) 12/01/2014        no proetinuria       Cystatin C GFR 30 % 12/2016 on Creatinine of 2.5    1/2018 27%       Assessment:       1. CKD (chronic kidney disease), stage IV    2. Localized edema    3. Atherosclerotic AUGUSTUS (renal artery stenosis), bilateral     4. Essential hypertension    5. Vitamin D deficiency    6. Prostate cancer    7. CARMEN (acute kidney injury)        Plan:         CARMEN: Severe leg swelling improved off procardia. Much better now       1.  Chronic kidney disease stage IV : likely complications of Dye and cholesterol emboli ;  Keep plavix for now ; no heavy proteinuria; GFR 27 % ; no ACEI for now ; PTH is normal.     2.  Hypertension: dr. Garcia :off 4 meds due to GI bleed fup in one month     3.  Diabetic retinopathy:  eye examination with ophthalmologist yearly ; has had history of low sugars but none below 70      4.  Carotid bruit: USD -gualberto  carotid stenosis: on aspirin once a day for stroke prevention    5. Edema in LE [place]: on cardura and Norvasc off procardia :KCL 20 torsemide 20 mg doing well     6. Vit D def: level 25 in 2016 start Vit D weekly     7. Prostate cancer:  High PSA s/p  second Bx follow up with Dr. Mcmahan 1/2018 pending MRI     All plans d/w  ( grand daughter Mi )     For contact call : Abbey Jesus [phone]         Follow up 3 months    Cc Dr. Angel Palacios; dr. Jose Pnoce MD

## 2018-05-21 NOTE — PATIENT INSTRUCTIONS
"Conditions associated with angiodysplasia -- The prevalence of angiodysplasia is increased in older populations, in patients with GI bleeding, and in patients with certain predisposing conditions such as end-stage renal disease, von Willebrand disease, and possibly aortic stenosis.  End-stage renal disease -- Angiodysplasia account for approximately 20 and 30 percent of episodes of upper and lower GI bleeding, respectively [15,16], and for approximately one-half of recurrent episodes of upper GI bleeding in these patients [17]. They can occur anywhere along the GI tract and are usually multiple [17]. (See "Unique aspects of gastrointestinal disease in dialysis patients", section on 'Angiodysplasia'.)  The reason for the increased prevalence among patients with end-stage renal disease is unknown. One possible explanation is that the lesions are not more common, but are instead detected more frequently because of the increased risk of bleeding associated with uremia-induced platelet dysfunction leading to a higher number of endoscopic exams [16]. (See "Platelet dysfunction in uremia".)    "

## 2018-06-15 ENCOUNTER — LAB VISIT (OUTPATIENT)
Dept: LAB | Facility: HOSPITAL | Age: 83
End: 2018-06-15
Attending: INTERNAL MEDICINE
Payer: MEDICARE

## 2018-06-15 DIAGNOSIS — I10 ESSENTIAL HYPERTENSION: ICD-10-CM

## 2018-06-15 DIAGNOSIS — C61 PROSTATE CANCER: ICD-10-CM

## 2018-06-15 DIAGNOSIS — I70.1 ATHEROSCLEROTIC RAS (RENAL ARTERY STENOSIS), BILATERAL: ICD-10-CM

## 2018-06-15 DIAGNOSIS — N17.9 AKI (ACUTE KIDNEY INJURY): ICD-10-CM

## 2018-06-15 DIAGNOSIS — E55.9 VITAMIN D DEFICIENCY: ICD-10-CM

## 2018-06-15 DIAGNOSIS — R60.0 LOCALIZED EDEMA: ICD-10-CM

## 2018-06-15 DIAGNOSIS — N18.4 CKD (CHRONIC KIDNEY DISEASE), STAGE IV: ICD-10-CM

## 2018-06-15 LAB
ALBUMIN SERPL BCP-MCNC: 4 G/DL
ANION GAP SERPL CALC-SCNC: 13 MMOL/L
BASOPHILS # BLD AUTO: 0.1 K/UL
BASOPHILS NFR BLD: 1.2 %
BUN SERPL-MCNC: 48 MG/DL
CALCIUM SERPL-MCNC: 9.3 MG/DL
CHLORIDE SERPL-SCNC: 100 MMOL/L
CO2 SERPL-SCNC: 28 MMOL/L
CREAT SERPL-MCNC: 2.4 MG/DL
DIFFERENTIAL METHOD: ABNORMAL
EOSINOPHIL # BLD AUTO: 0.6 K/UL
EOSINOPHIL NFR BLD: 7.1 %
ERYTHROCYTE [DISTWIDTH] IN BLOOD BY AUTOMATED COUNT: 14.7 %
EST. GFR  (AFRICAN AMERICAN): 28 ML/MIN/1.73 M^2
EST. GFR  (NON AFRICAN AMERICAN): 24.2 ML/MIN/1.73 M^2
GLUCOSE SERPL-MCNC: 123 MG/DL
HCT VFR BLD AUTO: 35.1 %
HGB BLD-MCNC: 11.1 G/DL
IMM GRANULOCYTES # BLD AUTO: 0.04 K/UL
IMM GRANULOCYTES NFR BLD AUTO: 0.5 %
LYMPHOCYTES # BLD AUTO: 1.4 K/UL
LYMPHOCYTES NFR BLD: 17.8 %
MCH RBC QN AUTO: 28.2 PG
MCHC RBC AUTO-ENTMCNC: 31.6 G/DL
MCV RBC AUTO: 89 FL
MONOCYTES # BLD AUTO: 0.7 K/UL
MONOCYTES NFR BLD: 9.2 %
NEUTROPHILS # BLD AUTO: 5.2 K/UL
NEUTROPHILS NFR BLD: 64.2 %
NRBC BLD-RTO: 0 /100 WBC
PHOSPHATE SERPL-MCNC: 4 MG/DL
PLATELET # BLD AUTO: 432 K/UL
PMV BLD AUTO: 9.7 FL
POTASSIUM SERPL-SCNC: 3.7 MMOL/L
PTH-INTACT SERPL-MCNC: 81 PG/ML
RBC # BLD AUTO: 3.94 M/UL
SODIUM SERPL-SCNC: 141 MMOL/L
WBC # BLD AUTO: 8.07 K/UL

## 2018-06-15 PROCEDURE — 80069 RENAL FUNCTION PANEL: CPT

## 2018-06-15 PROCEDURE — 36415 COLL VENOUS BLD VENIPUNCTURE: CPT

## 2018-06-15 PROCEDURE — 83970 ASSAY OF PARATHORMONE: CPT

## 2018-06-15 PROCEDURE — 85025 COMPLETE CBC W/AUTO DIFF WBC: CPT

## 2018-06-16 LAB
CYSTATIN C SERPL-MCNC: 1.99 MG/L
GFR/BSA.PRED SERPLBLD CYS-BASED-ARV: 29 ML/MIN/BSA

## 2018-06-20 ENCOUNTER — OFFICE VISIT (OUTPATIENT)
Dept: NEPHROLOGY | Facility: CLINIC | Age: 83
End: 2018-06-20
Payer: MEDICARE

## 2018-06-20 VITALS
DIASTOLIC BLOOD PRESSURE: 72 MMHG | WEIGHT: 192.88 LBS | BODY MASS INDEX: 32.93 KG/M2 | SYSTOLIC BLOOD PRESSURE: 190 MMHG | HEART RATE: 56 BPM | HEIGHT: 64 IN

## 2018-06-20 DIAGNOSIS — E55.9 VITAMIN D DEFICIENCY: ICD-10-CM

## 2018-06-20 DIAGNOSIS — C61 PROSTATE CANCER: ICD-10-CM

## 2018-06-20 DIAGNOSIS — N18.4 CKD (CHRONIC KIDNEY DISEASE), STAGE IV: Primary | ICD-10-CM

## 2018-06-20 DIAGNOSIS — I10 ESSENTIAL HYPERTENSION: ICD-10-CM

## 2018-06-20 DIAGNOSIS — I70.1 ATHEROSCLEROTIC RAS (RENAL ARTERY STENOSIS), BILATERAL: ICD-10-CM

## 2018-06-20 DIAGNOSIS — R60.0 LOCALIZED EDEMA: ICD-10-CM

## 2018-06-20 PROCEDURE — 99214 OFFICE O/P EST MOD 30 MIN: CPT | Mod: S$PBB,,, | Performed by: INTERNAL MEDICINE

## 2018-06-20 PROCEDURE — 99999 PR PBB SHADOW E&M-EST. PATIENT-LVL II: CPT | Mod: PBBFAC,,, | Performed by: INTERNAL MEDICINE

## 2018-06-20 PROCEDURE — 99212 OFFICE O/P EST SF 10 MIN: CPT | Mod: PBBFAC | Performed by: INTERNAL MEDICINE

## 2018-06-20 RX ORDER — DOXAZOSIN 4 MG/1
4 TABLET ORAL NIGHTLY
Qty: 30 TABLET | Refills: 11 | Status: SHIPPED | OUTPATIENT
Start: 2018-06-20 | End: 2018-06-21 | Stop reason: SDUPTHER

## 2018-06-20 RX ORDER — LEVOTHYROXINE SODIUM 88 UG/1
TABLET ORAL
Refills: 4 | COMMUNITY
Start: 2018-06-04

## 2018-06-20 RX ORDER — AMLODIPINE BESYLATE 10 MG/1
10 TABLET ORAL DAILY
Qty: 30 TABLET | Refills: 3 | Status: SHIPPED | OUTPATIENT
Start: 2018-06-20 | End: 2018-06-21 | Stop reason: SDUPTHER

## 2018-06-20 NOTE — PROGRESS NOTES
Subjective:       Patient ID: Pedro Pro is a 82 y.o.   male who presents for follow up  evaluation of Chronic Kidney Disease and Hypertension    Edema   Pertinent negatives include no abdominal pain, arthralgias, chest pain, chills, congestion, coughing, diaphoresis, fatigue, fever, headaches, joint swelling, nausea, neck pain, rash or vomiting.   Hypertension   Pertinent negatives include no chest pain, headaches, neck pain, palpitations or shortness of breath.   Patient is a 79-year-old male [facility]; does not like doctors was  diagnosed with type II diabetes in  ; was found to have a creatinine 1.8 ; work showed diabetic nephropathy.  He does not speak English he had a friend who was translating for him.  From the history of present illness I gather was that he has had high blood pressure and multiple family members with severe diabetes and one of the siblings  of kidney failure ; he never got himself checked out;    10/2014 had scalp shingles s/p pain and meds     2014 he had  angio for PAD for Dr. Garcia repeat on both legs in 2015 and 2015 s/p PTCA     3/2015 his creatinine went up to 2.8 with acute kidney injury ; stopped  his ACE inhibitor ;  prostate biopsy c/w Prostate cancer pending staging and treatment options     2015 ACE inhibitor continued to be stopped, creatinine came down to 2.1, added calcium channel blocker for blood pressure control and hydrochlorothiazide for blood pressure and edema    2015 comes back for follow-up creatinine is stable at 2.1; hydrochlorothiazide increased to 25 mg to help treat edema and hypertension    10/2015 Norvasc 10 mg increased by Dr. Garcia     3/2016 High PSA dr. Kamara bx proven prostate cancer : no Rx since PSA stable pending 2nd bx     2016 creatinine higher 2.5     2016 GFR 30 % pm cystatin C 2nd prostate Bx shows stable prostate cancer     2017 creatinine was 2.4 GFR unchanged from 30%.  Norvasc.   Since the patient was on Norvasc and Procardia.  Hydrocodone thiazide added for edema.  Cardura 4 mg added for blood pressure control      June 2017 patient comes back for follow-up with a creatinine of 3.1, approximately GFR 20-25%.  Swelling of the legs and eyes.  Status post ophthalmology appointment in May 2017 showing no retinopathy.  Due to severe swelling of the legs we stopped the Procardia.  Acute kidney injury was attributed to over medications because of combination of Procardia and Norvasc.  6/21/17 creatinine down to 2.6 off Procardia    1/2018 Cyndi is better ; GFR 27%     5/2018 GI bleed Hemoglobin down to 4.4 s/p 4 units PRBC and GI EGD and colonoscopy pending capsule ( possibly AVM) ; due to CYNDI and low BP stopped Hydralazine /norvasc /cardura and torsemide      Review of Systems   Constitutional: Negative.  Negative for activity change, appetite change, chills, diaphoresis, fatigue and fever.   HENT: Negative.  Negative for congestion and trouble swallowing.    Eyes: Negative.    Respiratory: Negative.  Negative for cough, chest tightness, shortness of breath and wheezing.    Cardiovascular: Positive for leg swelling. Negative for chest pain and palpitations.   Gastrointestinal: Negative.  Negative for abdominal distention, abdominal pain, nausea and vomiting.   Genitourinary: Negative.  Negative for decreased urine volume, difficulty urinating, dysuria, enuresis, flank pain, frequency, hematuria, penile swelling, scrotal swelling and urgency.   Musculoskeletal: Negative.  Negative for arthralgias, back pain, joint swelling and neck pain.   Skin: Negative for rash.   Neurological: Negative for tremors, seizures and headaches.        He has numbness and tingling in both feet occasional pain at nighttime   Psychiatric/Behavioral: Negative.  Negative for confusion and sleep disturbance. The patient is not nervous/anxious.        Objective:       Vitals:    06/20/18 1423   BP: (!) 190/72   Pulse: (!) 56  "  Weight: 87.5 kg (192 lb 14.4 oz)   Height: 5' 4" (1.626 m)     Weight loss 10 ib ( 210 ) 3/2017    Wt loss 16 ib (200) 6/2017     10/2017 stable weight   Physical Exam      Constitutional: He is oriented to person, place, and time. He appears well-developed and well-nourished. No distress.   HENT:   Head: Normocephalic.   Eyes: Conjunctivae and EOM are normal. Pupils are equal, round, and reactive to light. No scleral icterus.   Neck: Normal range of motion. Neck supple. No JVD present. No thyromegaly present.   Cardiovascular: Normal rate, regular rhythm, normal heart sounds and intact distal pulses. Exam reveals no gallop and no friction rub.   No murmur heard.  Loud P2   Pulmonary/Chest: Effort normal and breath sounds normal. No stridor. No respiratory distress. He has no wheezes. He has no rales. He exhibits no tenderness.   Abdominal: Soft. Bowel sounds are normal. He exhibits no distension and no mass. There is no tenderness. There is no rebound and no guarding.   morbid obesity; positive truncal obesity   Musculoskeletal: Normal range of motion. He exhibits trace edema improved   Cannot rule out cholesterol emboli   Neurological: He is alert and oriented to person, place, and time. He has normal reflexes. No cranial nerve deficit. He exhibits normal muscle tone. Coordination normal.   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 No rash noted. He is not diaphoretic. No erythema. No pallor.   Cholesterol emboli in Feet bilaterally    Psychiatric: He has a normal mood and affect. His behavior is normal. Judgment and thought content normal.       Lab Results   Component Value Date    CREATININE 2.4 (H) 06/15/2018    BUN 48 (H) 06/15/2018     06/15/2018    K 3.7 06/15/2018     06/15/2018    CO2 28 06/15/2018     Lab Results   Component Value Date    WBC 8.07 06/15/2018    HGB 11.1 (L) 06/15/2018    HCT 35.1 (L) 06/15/2018    MCV 89 " 06/15/2018     (H) 06/15/2018     Lab Results   Component Value Date    PTH 81.0 (H) 06/15/2018    CALCIUM 9.3 06/15/2018    PHOS 4.0 06/15/2018     Lab Results   Component Value Date    URICACID 8.1 (H) 12/01/2014        no proetinuria       Cystatin C GFR 30 % 12/2016 on Creatinine of 2.5    1/2018 27%       Assessment:       1. CKD (chronic kidney disease), stage IV    2. Localized edema    3. Atherosclerotic AUGUSTUS (renal artery stenosis), bilateral     4. Essential hypertension    5. Vitamin D deficiency    6. Prostate cancer        Plan:               1.  Chronic kidney disease stage IV : likely complications of Dye and cholesterol emboli ;  Keep plavix for now ; no heavy proteinuria; GFR 27 % ; no ACEI for now ; PTH is normal.     2.  Hypertension: dr. Garcia :off 4 meds due to GI bleed fup in one month BUT BP is too high now : restart meds for BP Norvasc and Cardura Hold diuretics for now     3.  Diabetic retinopathy:  eye examination with ophthalmologist yearly ; has had history of low sugars but none below 70      4.  Carotid bruit: USD -gualberto  carotid stenosis: on aspirin once a day for stroke prevention    5. Edema in LE Brown: on cardura and Norvasc off procardia :off diuretics will need to restart torsemide once leg start swelling     6. Vit D def: level 25 in 2016 start Vit D weekly     7. Prostate cancer:  High PSA s/p  second Bx follow up with Dr. Mcmahan 1/2018 pending MRI          For contact call : Abbey Jesus [phone]         Follow up 3 months    Cc Dr. Angel Palacios; dr. Jose Ponce MD

## 2018-06-21 RX ORDER — DOXAZOSIN 4 MG/1
4 TABLET ORAL NIGHTLY
Qty: 30 TABLET | Refills: 11 | Status: SHIPPED | OUTPATIENT
Start: 2018-06-21 | End: 2019-08-08 | Stop reason: SDUPTHER

## 2018-06-21 RX ORDER — AMLODIPINE BESYLATE 10 MG/1
10 TABLET ORAL DAILY
Qty: 30 TABLET | Refills: 3 | Status: SHIPPED | OUTPATIENT
Start: 2018-06-21 | End: 2019-01-03

## 2018-08-07 ENCOUNTER — TELEPHONE (OUTPATIENT)
Dept: NEPHROLOGY | Facility: CLINIC | Age: 83
End: 2018-08-07

## 2018-08-07 NOTE — TELEPHONE ENCOUNTER
S/W pt daughter Mi rescheduled pt appointment to August 14, 2018.  Mi verbalized understanding.  VB

## 2018-08-07 NOTE — TELEPHONE ENCOUNTER
----- Message from Katharine Padron sent at 8/7/2018  3:20 PM CDT -----  Pt granddaughter(Mi) at [phone]//states pt has an appt with Dr Ponce on 8-16-18//his wife is having surgery on 8-15-18//is wanting to know if possible to have an appt before 8-15-18//please call//lalo/virgen

## 2018-08-09 ENCOUNTER — LAB VISIT (OUTPATIENT)
Dept: LAB | Facility: HOSPITAL | Age: 83
End: 2018-08-09
Attending: INTERNAL MEDICINE
Payer: MEDICARE

## 2018-08-09 DIAGNOSIS — N18.4 CKD (CHRONIC KIDNEY DISEASE), STAGE IV: ICD-10-CM

## 2018-08-09 DIAGNOSIS — C61 PROSTATE CANCER: ICD-10-CM

## 2018-08-09 DIAGNOSIS — E55.9 VITAMIN D DEFICIENCY: ICD-10-CM

## 2018-08-09 DIAGNOSIS — R60.0 LOCALIZED EDEMA: ICD-10-CM

## 2018-08-09 DIAGNOSIS — I70.1 ATHEROSCLEROTIC RAS (RENAL ARTERY STENOSIS), BILATERAL: ICD-10-CM

## 2018-08-09 DIAGNOSIS — I10 ESSENTIAL HYPERTENSION: ICD-10-CM

## 2018-08-09 LAB
ALBUMIN SERPL BCP-MCNC: 3.9 G/DL
ANION GAP SERPL CALC-SCNC: 9 MMOL/L
BASOPHILS # BLD AUTO: 0.08 K/UL
BASOPHILS NFR BLD: 1.2 %
BUN SERPL-MCNC: 36 MG/DL
CALCIUM SERPL-MCNC: 9.1 MG/DL
CHLORIDE SERPL-SCNC: 108 MMOL/L
CO2 SERPL-SCNC: 23 MMOL/L
CREAT SERPL-MCNC: 2.1 MG/DL
DIFFERENTIAL METHOD: ABNORMAL
EOSINOPHIL # BLD AUTO: 0.7 K/UL
EOSINOPHIL NFR BLD: 10.9 %
ERYTHROCYTE [DISTWIDTH] IN BLOOD BY AUTOMATED COUNT: 17.6 %
EST. GFR  (AFRICAN AMERICAN): 32.9 ML/MIN/1.73 M^2
EST. GFR  (NON AFRICAN AMERICAN): 28.5 ML/MIN/1.73 M^2
GLUCOSE SERPL-MCNC: 91 MG/DL
HCT VFR BLD AUTO: 37.3 %
HGB BLD-MCNC: 11.2 G/DL
IMM GRANULOCYTES # BLD AUTO: 0.02 K/UL
IMM GRANULOCYTES NFR BLD AUTO: 0.3 %
LYMPHOCYTES # BLD AUTO: 1.4 K/UL
LYMPHOCYTES NFR BLD: 21.5 %
MCH RBC QN AUTO: 26.4 PG
MCHC RBC AUTO-ENTMCNC: 30 G/DL
MCV RBC AUTO: 88 FL
MONOCYTES # BLD AUTO: 0.7 K/UL
MONOCYTES NFR BLD: 10 %
NEUTROPHILS # BLD AUTO: 3.7 K/UL
NEUTROPHILS NFR BLD: 56.1 %
NRBC BLD-RTO: 0 /100 WBC
PHOSPHATE SERPL-MCNC: 3.7 MG/DL
PLATELET # BLD AUTO: 253 K/UL
PMV BLD AUTO: 10.4 FL
POTASSIUM SERPL-SCNC: 4.4 MMOL/L
PTH-INTACT SERPL-MCNC: 71 PG/ML
RBC # BLD AUTO: 4.24 M/UL
SODIUM SERPL-SCNC: 140 MMOL/L
WBC # BLD AUTO: 6.6 K/UL

## 2018-08-09 PROCEDURE — 85025 COMPLETE CBC W/AUTO DIFF WBC: CPT

## 2018-08-09 PROCEDURE — 36415 COLL VENOUS BLD VENIPUNCTURE: CPT

## 2018-08-09 PROCEDURE — 80069 RENAL FUNCTION PANEL: CPT

## 2018-08-09 PROCEDURE — 83970 ASSAY OF PARATHORMONE: CPT

## 2018-08-10 LAB
CYSTATIN C SERPL-MCNC: 1.74 MG/L
GFR/BSA.PRED SERPLBLD CYS-BASED-ARV: 34 ML/MIN/BSA

## 2018-08-14 ENCOUNTER — OFFICE VISIT (OUTPATIENT)
Dept: NEPHROLOGY | Facility: CLINIC | Age: 83
End: 2018-08-14
Payer: MEDICARE

## 2018-08-14 VITALS
SYSTOLIC BLOOD PRESSURE: 134 MMHG | WEIGHT: 205.25 LBS | HEART RATE: 74 BPM | HEIGHT: 65 IN | BODY MASS INDEX: 34.2 KG/M2 | DIASTOLIC BLOOD PRESSURE: 58 MMHG

## 2018-08-14 DIAGNOSIS — E55.9 VITAMIN D DEFICIENCY: ICD-10-CM

## 2018-08-14 DIAGNOSIS — C61 PROSTATE CANCER: ICD-10-CM

## 2018-08-14 DIAGNOSIS — R60.0 LOCALIZED EDEMA: ICD-10-CM

## 2018-08-14 DIAGNOSIS — N18.4 CKD (CHRONIC KIDNEY DISEASE) STAGE 4, GFR 15-29 ML/MIN: Primary | ICD-10-CM

## 2018-08-14 DIAGNOSIS — I70.1 ATHEROSCLEROTIC RAS (RENAL ARTERY STENOSIS), BILATERAL: ICD-10-CM

## 2018-08-14 DIAGNOSIS — I10 ESSENTIAL HYPERTENSION: ICD-10-CM

## 2018-08-14 PROCEDURE — 99214 OFFICE O/P EST MOD 30 MIN: CPT | Mod: S$PBB,,, | Performed by: INTERNAL MEDICINE

## 2018-08-14 PROCEDURE — 99213 OFFICE O/P EST LOW 20 MIN: CPT | Mod: PBBFAC | Performed by: INTERNAL MEDICINE

## 2018-08-14 PROCEDURE — 99999 PR PBB SHADOW E&M-EST. PATIENT-LVL III: CPT | Mod: PBBFAC,,, | Performed by: INTERNAL MEDICINE

## 2018-08-14 NOTE — PROGRESS NOTES
Subjective:       Patient ID: Pedro Pro is a 82 y.o.   male who presents for follow up  evaluation of Chronic Kidney Disease; Edema; and Hypertension    Hypertension   Pertinent negatives include no chest pain, headaches, neck pain, palpitations or shortness of breath.   Edema   Pertinent negatives include no abdominal pain, arthralgias, chest pain, chills, congestion, coughing, diaphoresis, fatigue, fever, headaches, joint swelling, nausea, neck pain, rash or vomiting.   Patient is a 79-year-old male from [facility]; does not like doctors was  diagnosed with type II diabetes in  ; was found to have a creatinine 1.8 ; work showed diabetic nephropathy.  He does not speak English he had a friend who was translating for him.  From the history of present illness I gather was that he has had high blood pressure and multiple family members with severe diabetes and one of the siblings  of kidney failure ; he never got himself checked out;    10/2014 had scalp shingles s/p pain and meds     2014 he had  angio for PAD for Dr. Garcia repeat on both legs in 2015 and 2015 s/p PTCA     3/2015 his creatinine went up to 2.8 with acute kidney injury ; stopped  his ACE inhibitor ;  prostate biopsy c/w Prostate cancer pending staging and treatment options     2015 ACE inhibitor continued to be stopped, creatinine came down to 2.1, added calcium channel blocker for blood pressure control and hydrochlorothiazide for blood pressure and edema    2015 comes back for follow-up creatinine is stable at 2.1; hydrochlorothiazide increased to 25 mg to help treat edema and hypertension    10/2015 Norvasc 10 mg increased by Dr. Garcia     3/2016 High PSA dr. Kamara bx proven prostate cancer : no Rx since PSA stable pending 2nd bx     2016 creatinine higher 2.5     2016 GFR 30 % pm cystatin C 2nd prostate Bx shows stable prostate cancer     2017 creatinine was 2.4 GFR unchanged from 30%.   Norvasc.  Since the patient was on Norvasc and Procardia.  Hydrocodone thiazide added for edema.  Cardura 4 mg added for blood pressure control      June 2017 patient comes back for follow-up with a creatinine of 3.1, approximately GFR 20-25%.  Swelling of the legs and eyes.  Status post ophthalmology appointment in May 2017 showing no retinopathy.  Due to severe swelling of the legs we stopped the Procardia.  Acute kidney injury was attributed to over medications because of combination of Procardia and Norvasc.  6/21/17 creatinine down to 2.6 off Procardia    1/2018 Cyndi is better ; GFR 27%     2/2018 MRI of prostate -gualberto for cancer     5/2018 GI bleed Hemoglobin down to 4.4 s/p 4 units PRBC and GI EGD and colonoscopy pending capsule ( possibly AVM) ; due to CYNDI and low BP stopped Hydralazine /norvasc /cardura and torsemide     8/2018 GFR 34 %      Review of Systems   Constitutional: Negative.  Negative for activity change, appetite change, chills, diaphoresis, fatigue and fever.   HENT: Negative.  Negative for congestion and trouble swallowing.    Eyes: Negative.    Respiratory: Negative.  Negative for cough, chest tightness, shortness of breath and wheezing.    Cardiovascular: Positive for leg swelling. Negative for chest pain and palpitations.   Gastrointestinal: Negative.  Negative for abdominal distention, abdominal pain, nausea and vomiting.   Genitourinary: Negative.  Negative for decreased urine volume, difficulty urinating, dysuria, enuresis, flank pain, frequency, hematuria, penile swelling, scrotal swelling and urgency.   Musculoskeletal: Negative.  Negative for arthralgias, back pain, joint swelling and neck pain.   Skin: Negative for rash.   Neurological: Negative for tremors, seizures and headaches.        He has numbness and tingling in both feet occasional pain at nighttime   Psychiatric/Behavioral: Negative.  Negative for confusion and sleep disturbance. The patient is not nervous/anxious.       "  Objective:       Vitals:    08/14/18 0943   BP: (!) 134/58   Pulse: 74   Weight: 93.1 kg (205 lb 4 oz)   Height: 5' 5" (1.651 m)     Weight loss 10 ib ( 210 ) 3/2017    Wt loss 16 ib (200) 6/2017     10/2017 stable weight     8/2018 weight 205     Physical Exam      Constitutional: He is oriented to person, place, and time. He appears well-developed and well-nourished. No distress.   HENT:   Head: Normocephalic.   Eyes: Conjunctivae and EOM are normal. Pupils are equal, round, and reactive to light. No scleral icterus.   Neck: Normal range of motion. Neck supple. No JVD present. No thyromegaly present.   Cardiovascular: Normal rate, regular rhythm, normal heart sounds and intact distal pulses. Exam reveals no gallop and no friction rub.   No murmur heard.  Loud P2   Pulmonary/Chest: Effort normal and breath sounds normal. No stridor. No respiratory distress. He has no wheezes. He has no rales. He exhibits no tenderness.   Abdominal: Soft. Bowel sounds are normal. He exhibits no distension and no mass. There is no tenderness. There is no rebound and no guarding.   morbid obesity; positive truncal obesity   Musculoskeletal: Normal range of motion. He exhibits trace edema improved   Cannot rule out cholesterol emboli   Neurological: He is alert and oriented to person, place, and time. He has normal reflexes. No cranial nerve deficit. He exhibits normal muscle tone. Coordination normal.   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 No rash noted. He is not diaphoretic. No erythema. No pallor.   Cholesterol emboli in Feet bilaterally    Psychiatric: He has a normal mood and affect. His behavior is normal. Judgment and thought content normal.       Lab Results   Component Value Date    CREATININE 2.1 (H) 08/09/2018    BUN 36 (H) 08/09/2018     08/09/2018    K 4.4 08/09/2018     08/09/2018    CO2 23 08/09/2018     Lab Results   Component Value " Date    WBC 6.60 08/09/2018    HGB 11.2 (L) 08/09/2018    HCT 37.3 (L) 08/09/2018    MCV 88 08/09/2018     08/09/2018     Lab Results   Component Value Date    PTH 71.0 08/09/2018    CALCIUM 9.1 08/09/2018    PHOS 3.7 08/09/2018     Lab Results   Component Value Date    URICACID 8.1 (H) 12/01/2014        no proetinuria       Cystatin C GFR 30 % 12/2016 on Creatinine of 2.5    1/2018 27% ---34% in august 2018       Assessment:       1. CKD (chronic kidney disease) stage 4, GFR 15-29 ml/min    2. Localized edema    3. Atherosclerotic AUGUSTUS (renal artery stenosis), bilateral     4. Essential hypertension    5. Vitamin D deficiency    6. Prostate cancer        Plan:               1.  Chronic kidney disease stage IV : likely complications of Dye and cholesterol emboli ;  Keep plavix for now ; no heavy proteinuria; GFR 27---34 % ; no ACEI for now ; PTH is normal.     2.  Hypertension: dr. Garcia :off 4 meds due to GI bleed fup in one month BUT BP is too high now : restart meds for BP Norvasc and Cardura Hold diuretics for now     3.  Diabetic retinopathy:  eye examination with ophthalmologist yearly ; has had history of low sugars but none below 70      4.  Carotid bruit: USD -gualberto  carotid stenosis: on aspirin once a day for stroke prevention    5. Edema in LE [place]: on cardura and Norvasc off procardia :off diuretics will need to restart torsemide once leg start swelling     6. Vit D def: level 25 in 2016 start Vit D weekly     7. Prostate cancer:  High PSA s/p  second Bx follow up with Dr. Mcmahan 1/2018 pending MRI     8. Anemia : mild. Hx of GI Bleed. Check iron on follow up          For contact call : Abbey Jesus [phone]         Follow up 3-4 months    Cc Dr. Angel Palacios; dr. Jose Ponce MD

## 2018-11-15 ENCOUNTER — TELEPHONE (OUTPATIENT)
Dept: NEPHROLOGY | Facility: CLINIC | Age: 83
End: 2018-11-15

## 2018-11-15 NOTE — TELEPHONE ENCOUNTER
----- Message from Helen Cope sent at 11/15/2018 11:20 AM CST -----  Contact: pt wife  Calling to schedule an appointment and please advise [phone]

## 2018-12-27 ENCOUNTER — LAB VISIT (OUTPATIENT)
Dept: LAB | Facility: HOSPITAL | Age: 83
End: 2018-12-27
Attending: INTERNAL MEDICINE
Payer: MEDICARE

## 2018-12-27 DIAGNOSIS — C61 PROSTATE CANCER: ICD-10-CM

## 2018-12-27 DIAGNOSIS — N18.4 CKD (CHRONIC KIDNEY DISEASE) STAGE 4, GFR 15-29 ML/MIN: ICD-10-CM

## 2018-12-27 DIAGNOSIS — I10 ESSENTIAL HYPERTENSION: ICD-10-CM

## 2018-12-27 DIAGNOSIS — E55.9 VITAMIN D DEFICIENCY: ICD-10-CM

## 2018-12-27 DIAGNOSIS — I70.1 ATHEROSCLEROTIC RAS (RENAL ARTERY STENOSIS), BILATERAL: ICD-10-CM

## 2018-12-27 DIAGNOSIS — R60.0 LOCALIZED EDEMA: ICD-10-CM

## 2018-12-27 LAB
ALBUMIN SERPL BCP-MCNC: 3.8 G/DL
ANION GAP SERPL CALC-SCNC: 8 MMOL/L
BASOPHILS # BLD AUTO: 0.08 K/UL
BASOPHILS NFR BLD: 1.1 %
BUN SERPL-MCNC: 36 MG/DL
CALCIUM SERPL-MCNC: 9.3 MG/DL
CHLORIDE SERPL-SCNC: 106 MMOL/L
CO2 SERPL-SCNC: 27 MMOL/L
CREAT SERPL-MCNC: 2.2 MG/DL
DIFFERENTIAL METHOD: ABNORMAL
EOSINOPHIL # BLD AUTO: 0.6 K/UL
EOSINOPHIL NFR BLD: 8.7 %
ERYTHROCYTE [DISTWIDTH] IN BLOOD BY AUTOMATED COUNT: 14.7 %
EST. GFR  (AFRICAN AMERICAN): 30.9 ML/MIN/1.73 M^2
EST. GFR  (NON AFRICAN AMERICAN): 26.7 ML/MIN/1.73 M^2
GLUCOSE SERPL-MCNC: 106 MG/DL
HCT VFR BLD AUTO: 39.1 %
HGB BLD-MCNC: 12.5 G/DL
IMM GRANULOCYTES # BLD AUTO: 0.05 K/UL
IMM GRANULOCYTES NFR BLD AUTO: 0.7 %
IRON SERPL-MCNC: 116 UG/DL
LYMPHOCYTES # BLD AUTO: 1.6 K/UL
LYMPHOCYTES NFR BLD: 22.3 %
MCH RBC QN AUTO: 31.1 PG
MCHC RBC AUTO-ENTMCNC: 32 G/DL
MCV RBC AUTO: 97 FL
MONOCYTES # BLD AUTO: 0.7 K/UL
MONOCYTES NFR BLD: 9.5 %
NEUTROPHILS # BLD AUTO: 4.1 K/UL
NEUTROPHILS NFR BLD: 57.7 %
NRBC BLD-RTO: 0 /100 WBC
PHOSPHATE SERPL-MCNC: 3.9 MG/DL
PLATELET # BLD AUTO: 214 K/UL
PMV BLD AUTO: 10.5 FL
POTASSIUM SERPL-SCNC: 4.7 MMOL/L
PTH-INTACT SERPL-MCNC: 53 PG/ML
RBC # BLD AUTO: 4.02 M/UL
SATURATED IRON: 32 %
SODIUM SERPL-SCNC: 141 MMOL/L
TOTAL IRON BINDING CAPACITY: 366 UG/DL
TRANSFERRIN SERPL-MCNC: 247 MG/DL
WBC # BLD AUTO: 7.03 K/UL

## 2018-12-27 PROCEDURE — 80069 RENAL FUNCTION PANEL: CPT

## 2018-12-27 PROCEDURE — 83540 ASSAY OF IRON: CPT

## 2018-12-27 PROCEDURE — 83970 ASSAY OF PARATHORMONE: CPT

## 2018-12-27 PROCEDURE — 36415 COLL VENOUS BLD VENIPUNCTURE: CPT

## 2018-12-27 PROCEDURE — 85025 COMPLETE CBC W/AUTO DIFF WBC: CPT

## 2018-12-29 LAB
CYSTATIN C SERPL-MCNC: 1.76 MG/L
GFR/BSA.PRED SERPLBLD CYS-BASED-ARV: 33 ML/MIN/BSA

## 2019-01-03 ENCOUNTER — OFFICE VISIT (OUTPATIENT)
Dept: NEPHROLOGY | Facility: CLINIC | Age: 84
End: 2019-01-03
Payer: MEDICARE

## 2019-01-03 VITALS
DIASTOLIC BLOOD PRESSURE: 59 MMHG | HEIGHT: 64 IN | HEART RATE: 59 BPM | BODY MASS INDEX: 35.95 KG/M2 | WEIGHT: 210.56 LBS | SYSTOLIC BLOOD PRESSURE: 144 MMHG

## 2019-01-03 DIAGNOSIS — E55.9 VITAMIN D DEFICIENCY: ICD-10-CM

## 2019-01-03 DIAGNOSIS — R60.0 LOCALIZED EDEMA: ICD-10-CM

## 2019-01-03 DIAGNOSIS — I70.1 ATHEROSCLEROTIC RAS (RENAL ARTERY STENOSIS), BILATERAL: ICD-10-CM

## 2019-01-03 DIAGNOSIS — N18.4 CKD (CHRONIC KIDNEY DISEASE) STAGE 4, GFR 15-29 ML/MIN: Primary | ICD-10-CM

## 2019-01-03 DIAGNOSIS — I10 ESSENTIAL HYPERTENSION: ICD-10-CM

## 2019-01-03 PROCEDURE — 99214 OFFICE O/P EST MOD 30 MIN: CPT | Mod: S$PBB,,, | Performed by: INTERNAL MEDICINE

## 2019-01-03 PROCEDURE — 99999 PR PBB SHADOW E&M-EST. PATIENT-LVL III: CPT | Mod: PBBFAC,,, | Performed by: INTERNAL MEDICINE

## 2019-01-03 PROCEDURE — 99999 PR PBB SHADOW E&M-EST. PATIENT-LVL III: ICD-10-PCS | Mod: PBBFAC,,, | Performed by: INTERNAL MEDICINE

## 2019-01-03 PROCEDURE — 99214 PR OFFICE/OUTPT VISIT, EST, LEVL IV, 30-39 MIN: ICD-10-PCS | Mod: S$PBB,,, | Performed by: INTERNAL MEDICINE

## 2019-01-03 PROCEDURE — 99213 OFFICE O/P EST LOW 20 MIN: CPT | Mod: PBBFAC | Performed by: INTERNAL MEDICINE

## 2019-01-03 RX ORDER — FUROSEMIDE 20 MG/1
20 TABLET ORAL 2 TIMES DAILY
COMMUNITY

## 2019-01-03 NOTE — PROGRESS NOTES
Subjective:       Patient ID: Pedro Pro is a 83 y.o.   male who presents for follow up  evaluation of Chronic Kidney Disease; Edema; and Hypertension    Edema   Pertinent negatives include no abdominal pain, arthralgias, chest pain, chills, congestion, coughing, diaphoresis, fatigue, fever, headaches, joint swelling, nausea, neck pain, rash or vomiting.   Hypertension   Pertinent negatives include no chest pain, headaches, neck pain, palpitations or shortness of breath.   Patient is a 79-year-old male from [facility]; does not like doctors was  diagnosed with type II diabetes in  ; was found to have a creatinine 1.8 ; work showed diabetic nephropathy.  He does not speak English he had a friend who was translating for him.  From the history of present illness I gather was that he has had high blood pressure and multiple family members with severe diabetes and one of the siblings  of kidney failure ; he never got himself checked out;    10/2014 had scalp shingles s/p pain and meds     2014 he had  angio for PAD for Dr. Garcia repeat on both legs in 2015 and 2015 s/p PTCA     3/2015 his creatinine went up to 2.8 with acute kidney injury ; stopped  his ACE inhibitor ;  prostate biopsy c/w Prostate cancer pending staging and treatment options     2015 ACE inhibitor continued to be stopped, creatinine came down to 2.1, added calcium channel blocker for blood pressure control and hydrochlorothiazide for blood pressure and edema    2015 comes back for follow-up creatinine is stable at 2.1; hydrochlorothiazide increased to 25 mg to help treat edema and hypertension    10/2015 Norvasc 10 mg increased by Dr. Garcia     3/2016 High PSA dr. Kamara bx proven prostate cancer : no Rx since PSA stable pending 2nd bx     2016 creatinine higher 2.5     2016 GFR 30 % pm cystatin C 2nd prostate Bx shows stable prostate cancer     2017 creatinine was 2.4 GFR unchanged from 30%.   Norvasc.  Since the patient was on Norvasc and Procardia.  Hydrocodone thiazide added for edema.  Cardura 4 mg added for blood pressure control      June 2017 patient comes back for follow-up with a creatinine of 3.1, approximately GFR 20-25%.  Swelling of the legs and eyes.  Status post ophthalmology appointment in May 2017 showing no retinopathy.  Due to severe swelling of the legs we stopped the Procardia.  Acute kidney injury was attributed to over medications because of combination of Procardia and Norvasc.  6/21/17 creatinine down to 2.6 off Procardia    1/2018 Cyndi is better ; GFR 27%     2/2018 MRI of prostate -gualberto for cancer     5/2018 GI bleed Hemoglobin down to 4.4 s/p 4 units PRBC and GI EGD and colonoscopy pending capsule ( possibly AVM) ; due to CYNID and low BP stopped Hydralazine /norvasc /cardura and torsemide     8/2018 GFR 34 %      Review of Systems   Constitutional: Negative.  Negative for activity change, appetite change, chills, diaphoresis, fatigue and fever.   HENT: Negative.  Negative for congestion and trouble swallowing.    Eyes: Negative.    Respiratory: Negative.  Negative for cough, chest tightness, shortness of breath and wheezing.    Cardiovascular: Positive for leg swelling. Negative for chest pain and palpitations.   Gastrointestinal: Negative.  Negative for abdominal distention, abdominal pain, nausea and vomiting.   Genitourinary: Negative.  Negative for decreased urine volume, difficulty urinating, dysuria, enuresis, flank pain, frequency, hematuria, penile swelling, scrotal swelling and urgency.   Musculoskeletal: Negative.  Negative for arthralgias, back pain, joint swelling and neck pain.   Skin: Negative for rash.   Neurological: Negative for tremors, seizures and headaches.        He has numbness and tingling in both feet occasional pain at nighttime   Psychiatric/Behavioral: Negative.  Negative for confusion and sleep disturbance. The patient is not nervous/anxious.       "  Objective:       Vitals:    01/03/19 1225   BP: (!) 144/59   Pulse: (!) 59   Weight: 95.5 kg (210 lb 8.6 oz)   Height: 5' 4" (1.626 m)     Weight loss 10 ib ( 210 ) 3/2017    Wt loss 16 ib (200) 6/2017     10/2017 stable weight     8/2018 weight 205     1/2019 210 ib    Physical Exam      Constitutional: He is oriented to person, place, and time. He appears well-developed and well-nourished. No distress.   HENT:   Head: Normocephalic.   Eyes: Conjunctivae and EOM are normal. Pupils are equal, round, and reactive to light. No scleral icterus.   Neck: Normal range of motion. Neck supple. No JVD present. No thyromegaly present.   Cardiovascular: Normal rate, regular rhythm, normal heart sounds and intact distal pulses. Exam reveals no gallop and no friction rub.   No murmur heard.  Loud P2   Pulmonary/Chest: Effort normal and breath sounds normal. No stridor. No respiratory distress. He has no wheezes. He has no rales. He exhibits no tenderness.   Abdominal: Soft. Bowel sounds are normal. He exhibits no distension and no mass. There is no tenderness. There is no rebound and no guarding.   morbid obesity; positive truncal obesity   Musculoskeletal: Normal range of motion. He exhibits trace edema improved   Cannot rule out cholesterol emboli   Neurological: He is alert and oriented to person, place, and time. He has normal reflexes. No cranial nerve deficit. He exhibits normal muscle tone. Coordination normal.   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 No rash noted. He is not diaphoretic. No erythema. No pallor.   Cholesterol emboli in Feet bilaterally    Psychiatric: He has a normal mood and affect. His behavior is normal. Judgment and thought content normal.       Lab Results   Component Value Date    CREATININE 2.2 (H) 12/27/2018    BUN 36 (H) 12/27/2018     12/27/2018    K 4.7 12/27/2018     12/27/2018    CO2 27 12/27/2018     Lab " Results   Component Value Date    WBC 7.03 12/27/2018    HGB 12.5 (L) 12/27/2018    HCT 39.1 (L) 12/27/2018    MCV 97 12/27/2018     12/27/2018     Lab Results   Component Value Date    PTH 53.0 12/27/2018    CALCIUM 9.3 12/27/2018    PHOS 3.9 12/27/2018     Lab Results   Component Value Date    URICACID 8.1 (H) 12/01/2014        no proetinuria       Cystatin C GFR 30 % 12/2016 on Creatinine of 2.5    1/2018 27% ---34% in august 2018       Assessment:       1. CKD (chronic kidney disease) stage 4, GFR 15-29 ml/min    2. Localized edema    3. Atherosclerotic AUGUSTUS (renal artery stenosis), bilateral     4. Essential hypertension    5. Vitamin D deficiency        Plan:               1.  Chronic kidney disease stage III : likely complications of Dye and cholesterol emboli ;  Keep plavix for now ; no heavy proteinuria; GFR 27---34 % ; no ACEI for now ; PTH is normal.     2.  Hypertension: dr. Garcia :controlled     3.  Diabetic retinopathy:  eye examination with ophthalmologist yearly ; has had history of low sugars but none below 70      4.  Carotid bruit: USD -gualberto  carotid stenosis: on aspirin once a day for stroke prevention    5. Edema in LE [place]: on cardura and Norvasc off procardia : much better now     6. Vit D def: Vit D weekly     7. Prostate cancer:  High PSA s/p  second Bx follow up with Dr. Mcmahan      8. Anemia : mild. Hx of GI Bleed. Check iron on follow up          For contact call : Abbey Jesus [phone]         Follow up 3-4 months    Cc Dr. Angel Palacios; dr. Jose Ponce MD

## 2019-05-07 RX ORDER — POTASSIUM CHLORIDE 20 MEQ/1
20 TABLET, EXTENDED RELEASE ORAL 2 TIMES DAILY
Qty: 180 TABLET | Refills: 3 | Status: SHIPPED | OUTPATIENT
Start: 2019-05-07 | End: 2021-01-25 | Stop reason: SDUPTHER

## 2019-06-13 ENCOUNTER — LAB VISIT (OUTPATIENT)
Dept: LAB | Facility: HOSPITAL | Age: 84
End: 2019-06-13
Attending: INTERNAL MEDICINE
Payer: MEDICARE

## 2019-06-13 DIAGNOSIS — I70.1 ATHEROSCLEROTIC RAS (RENAL ARTERY STENOSIS), BILATERAL: ICD-10-CM

## 2019-06-13 DIAGNOSIS — I10 ESSENTIAL HYPERTENSION: ICD-10-CM

## 2019-06-13 DIAGNOSIS — R60.0 LOCALIZED EDEMA: ICD-10-CM

## 2019-06-13 DIAGNOSIS — N18.4 CKD (CHRONIC KIDNEY DISEASE) STAGE 4, GFR 15-29 ML/MIN: ICD-10-CM

## 2019-06-13 DIAGNOSIS — E55.9 VITAMIN D DEFICIENCY: ICD-10-CM

## 2019-06-13 LAB
25(OH)D3+25(OH)D2 SERPL-MCNC: 32 NG/ML (ref 30–96)
ALBUMIN SERPL BCP-MCNC: 4 G/DL (ref 3.5–5.2)
ANION GAP SERPL CALC-SCNC: 12 MMOL/L (ref 8–16)
BASOPHILS # BLD AUTO: 0.06 K/UL (ref 0–0.2)
BASOPHILS NFR BLD: 1 % (ref 0–1.9)
BUN SERPL-MCNC: 29 MG/DL (ref 8–23)
CALCIUM SERPL-MCNC: 9.1 MG/DL (ref 8.7–10.5)
CHLORIDE SERPL-SCNC: 103 MMOL/L (ref 95–110)
CO2 SERPL-SCNC: 24 MMOL/L (ref 23–29)
CREAT SERPL-MCNC: 2 MG/DL (ref 0.5–1.4)
DIFFERENTIAL METHOD: ABNORMAL
EOSINOPHIL # BLD AUTO: 0.4 K/UL (ref 0–0.5)
EOSINOPHIL NFR BLD: 5.9 % (ref 0–8)
ERYTHROCYTE [DISTWIDTH] IN BLOOD BY AUTOMATED COUNT: 13.3 % (ref 11.5–14.5)
EST. GFR  (AFRICAN AMERICAN): 34.7 ML/MIN/1.73 M^2
EST. GFR  (NON AFRICAN AMERICAN): 30 ML/MIN/1.73 M^2
GLUCOSE SERPL-MCNC: 111 MG/DL (ref 70–110)
HCT VFR BLD AUTO: 39.5 % (ref 40–54)
HGB BLD-MCNC: 12.6 G/DL (ref 14–18)
IMM GRANULOCYTES # BLD AUTO: 0.04 K/UL (ref 0–0.04)
IMM GRANULOCYTES NFR BLD AUTO: 0.7 % (ref 0–0.5)
LYMPHOCYTES # BLD AUTO: 1.2 K/UL (ref 1–4.8)
LYMPHOCYTES NFR BLD: 19.9 % (ref 18–48)
MCH RBC QN AUTO: 31.1 PG (ref 27–31)
MCHC RBC AUTO-ENTMCNC: 31.9 G/DL (ref 32–36)
MCV RBC AUTO: 98 FL (ref 82–98)
MONOCYTES # BLD AUTO: 0.6 K/UL (ref 0.3–1)
MONOCYTES NFR BLD: 9.5 % (ref 4–15)
NEUTROPHILS # BLD AUTO: 3.8 K/UL (ref 1.8–7.7)
NEUTROPHILS NFR BLD: 63 % (ref 38–73)
NRBC BLD-RTO: 0 /100 WBC
PHOSPHATE SERPL-MCNC: 3.5 MG/DL (ref 2.7–4.5)
PLATELET # BLD AUTO: 264 K/UL (ref 150–350)
PMV BLD AUTO: 10.1 FL (ref 9.2–12.9)
POTASSIUM SERPL-SCNC: 4.5 MMOL/L (ref 3.5–5.1)
RBC # BLD AUTO: 4.05 M/UL (ref 4.6–6.2)
SODIUM SERPL-SCNC: 139 MMOL/L (ref 136–145)
WBC # BLD AUTO: 6.08 K/UL (ref 3.9–12.7)

## 2019-06-13 PROCEDURE — 80069 RENAL FUNCTION PANEL: CPT

## 2019-06-13 PROCEDURE — 82306 VITAMIN D 25 HYDROXY: CPT

## 2019-06-13 PROCEDURE — 85025 COMPLETE CBC W/AUTO DIFF WBC: CPT

## 2019-06-13 PROCEDURE — 36415 COLL VENOUS BLD VENIPUNCTURE: CPT

## 2019-06-14 LAB
CYSTATIN C SERPL-MCNC: 1.79 MG/L
GFR/BSA.PRED SERPLBLD CYS-BASED-ARV: 33 ML/MIN/BSA

## 2019-06-20 ENCOUNTER — OFFICE VISIT (OUTPATIENT)
Dept: NEPHROLOGY | Facility: CLINIC | Age: 84
End: 2019-06-20
Payer: MEDICARE

## 2019-06-20 VITALS
HEART RATE: 55 BPM | HEIGHT: 64 IN | BODY MASS INDEX: 35.9 KG/M2 | SYSTOLIC BLOOD PRESSURE: 141 MMHG | DIASTOLIC BLOOD PRESSURE: 49 MMHG | WEIGHT: 210.31 LBS

## 2019-06-20 DIAGNOSIS — R60.0 LOCALIZED EDEMA: ICD-10-CM

## 2019-06-20 DIAGNOSIS — I70.1 ATHEROSCLEROTIC RAS (RENAL ARTERY STENOSIS), BILATERAL: ICD-10-CM

## 2019-06-20 DIAGNOSIS — E55.9 VITAMIN D DEFICIENCY: ICD-10-CM

## 2019-06-20 DIAGNOSIS — N18.4 CKD (CHRONIC KIDNEY DISEASE) STAGE 4, GFR 15-29 ML/MIN: Primary | ICD-10-CM

## 2019-06-20 DIAGNOSIS — I10 ESSENTIAL HYPERTENSION: ICD-10-CM

## 2019-06-20 PROCEDURE — 99999 PR PBB SHADOW E&M-EST. PATIENT-LVL III: CPT | Mod: PBBFAC,,, | Performed by: INTERNAL MEDICINE

## 2019-06-20 PROCEDURE — 99213 OFFICE O/P EST LOW 20 MIN: CPT | Mod: PBBFAC | Performed by: INTERNAL MEDICINE

## 2019-06-20 PROCEDURE — 99999 PR PBB SHADOW E&M-EST. PATIENT-LVL III: ICD-10-PCS | Mod: PBBFAC,,, | Performed by: INTERNAL MEDICINE

## 2019-06-20 PROCEDURE — 99214 PR OFFICE/OUTPT VISIT, EST, LEVL IV, 30-39 MIN: ICD-10-PCS | Mod: S$PBB,,, | Performed by: INTERNAL MEDICINE

## 2019-06-20 PROCEDURE — 99214 OFFICE O/P EST MOD 30 MIN: CPT | Mod: S$PBB,,, | Performed by: INTERNAL MEDICINE

## 2019-06-20 RX ORDER — ASPIRIN 81 MG/1
81 TABLET ORAL DAILY
COMMUNITY

## 2019-06-20 NOTE — PROGRESS NOTES
Subjective:       Patient ID: Pedro Pro is a 83 y.o.   male who presents for follow up  evaluation of Chronic Kidney Disease; Hypertension; and Edema    Edema   Pertinent negatives include no abdominal pain, arthralgias, chest pain, chills, congestion, coughing, diaphoresis, fatigue, fever, headaches, joint swelling, nausea, neck pain, rash or vomiting.   Hypertension   Pertinent negatives include no chest pain, headaches, neck pain, palpitations or shortness of breath.   Patient is a 79-year-old male from [facility]; does not like doctors was  diagnosed with type II diabetes in  ; was found to have a creatinine 1.8 ; work showed diabetic nephropathy.  He does not speak English he had a friend who was translating for him.  From the history of present illness I gather was that he has had high blood pressure and multiple family members with severe diabetes and one of the siblings  of kidney failure ; he never got himself checked out;    10/2014 had scalp shingles s/p pain and meds     2014 he had  angio for PAD for Dr. Garcia repeat on both legs in 2015 and 2015 s/p PTCA     3/2015 his creatinine went up to 2.8 with acute kidney injury ; stopped  his ACE inhibitor ;  prostate biopsy c/w Prostate cancer pending staging and treatment options     2015 ACE inhibitor continued to be stopped, creatinine came down to 2.1, added calcium channel blocker for blood pressure control and hydrochlorothiazide for blood pressure and edema    2015 comes back for follow-up creatinine is stable at 2.1; hydrochlorothiazide increased to 25 mg to help treat edema and hypertension    10/2015 Norvasc 10 mg increased by Dr. Garcia     3/2016 High PSA dr. Kamara bx proven prostate cancer : no Rx since PSA stable pending 2nd bx     2016 creatinine higher 2.5     2016 GFR 30 % pm cystatin C 2nd prostate Bx shows stable prostate cancer     2017 creatinine was 2.4 GFR unchanged from 30%.   Norvasc.  Since the patient was on Norvasc and Procardia.  Hydrocodone thiazide added for edema.  Cardura 4 mg added for blood pressure control      June 2017 patient comes back for follow-up with a creatinine of 3.1, approximately GFR 20-25%.  Swelling of the legs and eyes.  Status post ophthalmology appointment in May 2017 showing no retinopathy.  Due to severe swelling of the legs we stopped the Procardia.  Acute kidney injury was attributed to over medications because of combination of Procardia and Norvasc.  6/21/17 creatinine down to 2.6 off Procardia    1/2018 yCndi is better ; GFR 27%     2/2018 MRI of prostate -gualberto for cancer     5/2018 GI bleed Hemoglobin down to 4.4 s/p 4 units PRBC and GI EGD and colonoscopy pending capsule ( possibly AVM) ; due to CYNDI and low BP stopped Hydralazine /norvasc /cardura and torsemide     8/2018 GFR 34 %     6/2019 d/c fenofibrate ( GFR 33 %) d/c PPI      Review of Systems   Constitutional: Negative.  Negative for activity change, appetite change, chills, diaphoresis, fatigue and fever.   HENT: Negative.  Negative for congestion and trouble swallowing.    Eyes: Negative.    Respiratory: Negative.  Negative for cough, chest tightness, shortness of breath and wheezing.    Cardiovascular: Positive for leg swelling. Negative for chest pain and palpitations.   Gastrointestinal: Negative.  Negative for abdominal distention, abdominal pain, nausea and vomiting.   Genitourinary: Negative.  Negative for decreased urine volume, difficulty urinating, dysuria, enuresis, flank pain, frequency, hematuria, penile swelling, scrotal swelling and urgency.   Musculoskeletal: Negative.  Negative for arthralgias, back pain, joint swelling and neck pain.   Skin: Negative for rash.   Neurological: Negative for tremors, seizures and headaches.        He has numbness and tingling in both feet occasional pain at nighttime   Psychiatric/Behavioral: Negative.  Negative for confusion and sleep  "disturbance. The patient is not nervous/anxious.        Objective:       Vitals:    06/20/19 1250   BP: (!) 141/49   Pulse: (!) 55   Weight: 95.4 kg (210 lb 5.1 oz)   Height: 5' 4" (1.626 m)     Weight loss 10 ib ( 210 ) 3/2017    Wt loss 16 ib (200) 6/2017     10/2017 stable weight     8/2018 weight 205     1/2019 210 ib    Physical Exam      Constitutional: He is oriented to person, place, and time. He appears well-developed and well-nourished. No distress.   HENT:   Head: Normocephalic.   Eyes: Conjunctivae and EOM are normal. Pupils are equal, round, and reactive to light. No scleral icterus.   Neck: Normal range of motion. Neck supple. No JVD present. No thyromegaly present.   Cardiovascular: Normal rate, regular rhythm, normal heart sounds and intact distal pulses. Exam reveals no gallop and no friction rub.   No murmur heard.  Loud P2   Pulmonary/Chest: Effort normal and breath sounds normal. No stridor. No respiratory distress. He has no wheezes. He has no rales. He exhibits no tenderness.   Abdominal: Soft. Bowel sounds are normal. He exhibits no distension and no mass. There is no tenderness. There is no rebound and no guarding.   morbid obesity; positive truncal obesity   Musculoskeletal: Normal range of motion. He exhibits trace edema improved   Cannot rule out cholesterol emboli   Neurological: He is alert and oriented to person, place, and time. He has normal reflexes. No cranial nerve deficit. He exhibits normal muscle tone. Coordination normal.   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 No rash noted. He is not diaphoretic. No erythema. No pallor.   Cholesterol emboli in Feet bilaterally    Psychiatric: He has a normal mood and affect. His behavior is normal. Judgment and thought content normal.       Lab Results   Component Value Date    CREATININE 2.0 (H) 06/13/2019    BUN 29 (H) 06/13/2019     06/13/2019    K 4.5 06/13/2019 "     06/13/2019    CO2 24 06/13/2019     Lab Results   Component Value Date    WBC 6.08 06/13/2019    HGB 12.6 (L) 06/13/2019    HCT 39.5 (L) 06/13/2019    MCV 98 06/13/2019     06/13/2019     Lab Results   Component Value Date    PTH 53.0 12/27/2018    CALCIUM 9.1 06/13/2019    PHOS 3.5 06/13/2019     Lab Results   Component Value Date    URICACID 8.1 (H) 12/01/2014        no proetinuria       Cystatin C GFR 30 % 12/2016 on Creatinine of 2.5    1/2018 27% ---34% in august 2018       Assessment:       1. CKD (chronic kidney disease) stage 4, GFR 15-29 ml/min    2. Localized edema    3. Atherosclerotic AUGUSTUS (renal artery stenosis), bilateral     4. Essential hypertension    5. Vitamin D deficiency        Plan:               1.  Chronic kidney disease stage III : likely complications of Dye and cholesterol emboli ;  Keep plavix for now ; no heavy proteinuria; GFR 27---33 % ; no ACEI for now ; PTH is normal.  DC fenofibrate since the patient has GFR about 30% and is contraindicated in this situation. D/c Protonix     2.  Hypertension: dr. Garcia :controlled     3.  Diabetic retinopathy:  eye examination with ophthalmologist yearly ; has had history of low sugars but none below 70      4.  Carotid bruit: USD -gualberto  carotid stenosis: on aspirin once a day for stroke prevention    5. Edema in LE [place]: on cardura and Norvasc off procardia : much better now     6. Vit D def: Vit D weekly     7. Prostate cancer:  High PSA s/p  second Bx follow up with Dr. Mcmahan      8. Anemia : mild. Hx of GI Bleed. Check iron on follow up          For contact call : Abbey Jesus [phone]         Follow up 3-4 months    Cc Dr. Angel Palacios; dr. Jose Ponce MD

## 2019-08-08 RX ORDER — DOXAZOSIN 4 MG/1
4 TABLET ORAL NIGHTLY
Qty: 30 TABLET | Refills: 11 | Status: SHIPPED | OUTPATIENT
Start: 2019-08-08 | End: 2020-08-05

## 2019-10-04 ENCOUNTER — LAB VISIT (OUTPATIENT)
Dept: LAB | Facility: HOSPITAL | Age: 84
End: 2019-10-04
Attending: INTERNAL MEDICINE
Payer: MEDICARE

## 2019-10-04 DIAGNOSIS — N18.4 CKD (CHRONIC KIDNEY DISEASE) STAGE 4, GFR 15-29 ML/MIN: ICD-10-CM

## 2019-10-04 DIAGNOSIS — E55.9 VITAMIN D DEFICIENCY: ICD-10-CM

## 2019-10-04 DIAGNOSIS — R60.0 LOCALIZED EDEMA: ICD-10-CM

## 2019-10-04 DIAGNOSIS — I70.1 ATHEROSCLEROTIC RAS (RENAL ARTERY STENOSIS), BILATERAL: ICD-10-CM

## 2019-10-04 DIAGNOSIS — I10 ESSENTIAL HYPERTENSION: ICD-10-CM

## 2019-10-04 LAB
25(OH)D3+25(OH)D2 SERPL-MCNC: 33 NG/ML (ref 30–96)
ALBUMIN SERPL BCP-MCNC: 4 G/DL (ref 3.5–5.2)
ANION GAP SERPL CALC-SCNC: 13 MMOL/L (ref 8–16)
BASOPHILS # BLD AUTO: 0.05 K/UL (ref 0–0.2)
BASOPHILS NFR BLD: 0.7 % (ref 0–1.9)
BUN SERPL-MCNC: 32 MG/DL (ref 8–23)
CALCIUM SERPL-MCNC: 8.9 MG/DL (ref 8.7–10.5)
CHLORIDE SERPL-SCNC: 100 MMOL/L (ref 95–110)
CO2 SERPL-SCNC: 28 MMOL/L (ref 23–29)
CREAT SERPL-MCNC: 2.5 MG/DL (ref 0.5–1.4)
DIFFERENTIAL METHOD: ABNORMAL
EOSINOPHIL # BLD AUTO: 0.3 K/UL (ref 0–0.5)
EOSINOPHIL NFR BLD: 4 % (ref 0–8)
ERYTHROCYTE [DISTWIDTH] IN BLOOD BY AUTOMATED COUNT: 16.5 % (ref 11.5–14.5)
EST. GFR  (AFRICAN AMERICAN): 26.5 ML/MIN/1.73 M^2
EST. GFR  (NON AFRICAN AMERICAN): 22.9 ML/MIN/1.73 M^2
GLUCOSE SERPL-MCNC: 133 MG/DL (ref 70–110)
HCT VFR BLD AUTO: 33.6 % (ref 40–54)
HGB BLD-MCNC: 9.9 G/DL (ref 14–18)
IMM GRANULOCYTES # BLD AUTO: 0.05 K/UL (ref 0–0.04)
IMM GRANULOCYTES NFR BLD AUTO: 0.7 % (ref 0–0.5)
LYMPHOCYTES # BLD AUTO: 1 K/UL (ref 1–4.8)
LYMPHOCYTES NFR BLD: 14.5 % (ref 18–48)
MCH RBC QN AUTO: 29.1 PG (ref 27–31)
MCHC RBC AUTO-ENTMCNC: 29.5 G/DL (ref 32–36)
MCV RBC AUTO: 99 FL (ref 82–98)
MONOCYTES # BLD AUTO: 0.7 K/UL (ref 0.3–1)
MONOCYTES NFR BLD: 9.8 % (ref 4–15)
NEUTROPHILS # BLD AUTO: 5 K/UL (ref 1.8–7.7)
NEUTROPHILS NFR BLD: 70.3 % (ref 38–73)
NRBC BLD-RTO: 0 /100 WBC
PHOSPHATE SERPL-MCNC: 4.1 MG/DL (ref 2.7–4.5)
PLATELET # BLD AUTO: 406 K/UL (ref 150–350)
PMV BLD AUTO: 9.7 FL (ref 9.2–12.9)
POTASSIUM SERPL-SCNC: 3.7 MMOL/L (ref 3.5–5.1)
PTH-INTACT SERPL-MCNC: 150 PG/ML (ref 9–77)
RBC # BLD AUTO: 3.4 M/UL (ref 4.6–6.2)
SODIUM SERPL-SCNC: 141 MMOL/L (ref 136–145)
WBC # BLD AUTO: 7.04 K/UL (ref 3.9–12.7)

## 2019-10-04 PROCEDURE — 36415 COLL VENOUS BLD VENIPUNCTURE: CPT

## 2019-10-04 PROCEDURE — 80069 RENAL FUNCTION PANEL: CPT

## 2019-10-04 PROCEDURE — 82306 VITAMIN D 25 HYDROXY: CPT

## 2019-10-04 PROCEDURE — 83970 ASSAY OF PARATHORMONE: CPT

## 2019-10-04 PROCEDURE — 85025 COMPLETE CBC W/AUTO DIFF WBC: CPT

## 2019-10-07 LAB
CYSTATIN C SERPL-MCNC: 1.86 MG/L
GFR/BSA.PRED SERPLBLD CYS-BASED-ARV: 31 ML/MIN/BSA

## 2019-10-10 ENCOUNTER — OFFICE VISIT (OUTPATIENT)
Dept: NEPHROLOGY | Facility: CLINIC | Age: 84
End: 2019-10-10
Payer: MEDICARE

## 2019-10-10 VITALS
RESPIRATION RATE: 16 BRPM | HEART RATE: 90 BPM | BODY MASS INDEX: 33.84 KG/M2 | WEIGHT: 198.19 LBS | SYSTOLIC BLOOD PRESSURE: 140 MMHG | HEIGHT: 64 IN | DIASTOLIC BLOOD PRESSURE: 80 MMHG

## 2019-10-10 DIAGNOSIS — E55.9 VITAMIN D DEFICIENCY: ICD-10-CM

## 2019-10-10 DIAGNOSIS — I10 ESSENTIAL HYPERTENSION: ICD-10-CM

## 2019-10-10 DIAGNOSIS — N18.4 CKD (CHRONIC KIDNEY DISEASE) STAGE 4, GFR 15-29 ML/MIN: Primary | ICD-10-CM

## 2019-10-10 DIAGNOSIS — I70.1 ATHEROSCLEROTIC RAS (RENAL ARTERY STENOSIS), BILATERAL: ICD-10-CM

## 2019-10-10 DIAGNOSIS — R60.0 LOCALIZED EDEMA: ICD-10-CM

## 2019-10-10 PROCEDURE — 99214 OFFICE O/P EST MOD 30 MIN: CPT | Mod: S$PBB,,, | Performed by: INTERNAL MEDICINE

## 2019-10-10 PROCEDURE — 99213 OFFICE O/P EST LOW 20 MIN: CPT | Mod: PBBFAC | Performed by: INTERNAL MEDICINE

## 2019-10-10 PROCEDURE — 99214 PR OFFICE/OUTPT VISIT, EST, LEVL IV, 30-39 MIN: ICD-10-PCS | Mod: S$PBB,,, | Performed by: INTERNAL MEDICINE

## 2019-10-10 PROCEDURE — 99999 PR PBB SHADOW E&M-EST. PATIENT-LVL III: CPT | Mod: PBBFAC,,, | Performed by: INTERNAL MEDICINE

## 2019-10-10 PROCEDURE — 99999 PR PBB SHADOW E&M-EST. PATIENT-LVL III: ICD-10-PCS | Mod: PBBFAC,,, | Performed by: INTERNAL MEDICINE

## 2019-10-10 RX ORDER — CLOBETASOL PROPIONATE 0.5 MG/G
CREAM TOPICAL 2 TIMES DAILY
Qty: 30 G | Refills: 1 | Status: SHIPPED | OUTPATIENT
Start: 2019-10-10

## 2019-10-10 NOTE — PROGRESS NOTES
Subjective:       Patient ID: Pedro Pro is a 83 y.o.   male who presents for follow up  evaluation of Chronic Kidney Disease and Anemia    Hypertension   Pertinent negatives include no chest pain, headaches, neck pain, palpitations or shortness of breath.   Edema   Pertinent negatives include no abdominal pain, arthralgias, chest pain, chills, congestion, coughing, diaphoresis, fatigue, fever, headaches, joint swelling, nausea, neck pain, rash or vomiting.   Anemia   There has been no abdominal pain, confusion, fever or palpitations.   Patient is a 79-year-old male from [facility]; does not like doctors was  diagnosed with type II diabetes in  ; was found to have a creatinine 1.8 ; work showed diabetic nephropathy.  He does not speak English he had a friend who was translating for him.  From the history of present illness I gather was that he has had high blood pressure and multiple family members with severe diabetes and one of the siblings  of kidney failure ; he never got himself checked out;    10/2014 had scalp shingles s/p pain and meds     2014 he had  angio for PAD for Dr. Garcia repeat on both legs in 2015 and 2015 s/p PTCA     3/2015 his creatinine went up to 2.8 with acute kidney injury ; stopped  his ACE inhibitor ;  prostate biopsy c/w Prostate cancer pending staging and treatment options     2015 ACE inhibitor continued to be stopped, creatinine came down to 2.1, added calcium channel blocker for blood pressure control and hydrochlorothiazide for blood pressure and edema    2015 comes back for follow-up creatinine is stable at 2.1; hydrochlorothiazide increased to 25 mg to help treat edema and hypertension    10/2015 Norvasc 10 mg increased by Dr. Garcia     3/2016 High PSA dr. Kamara bx proven prostate cancer : no Rx since PSA stable pending 2nd bx     2016 creatinine higher 2.5     2016 GFR 30 % pm cystatin C 2nd prostate Bx shows stable prostate  cancer     March 2017 creatinine was 2.4 GFR unchanged from 30%.  Norvasc.  Since the patient was on Norvasc and Procardia.  Hydrocodone thiazide added for edema.  Cardura 4 mg added for blood pressure control      June 2017 patient comes back for follow-up with a creatinine of 3.1, approximately GFR 20-25%.  Swelling of the legs and eyes.  Status post ophthalmology appointment in May 2017 showing no retinopathy.  Due to severe swelling of the legs we stopped the Procardia.  Acute kidney injury was attributed to over medications because of combination of Procardia and Norvasc.  6/21/17 creatinine down to 2.6 off Procardia    1/2018 Cyndi is better ; GFR 27%     2/2018 MRI of prostate -gualberto for cancer     5/2018 GI bleed Hemoglobin down to 4.4 s/p 4 units PRBC and GI EGD and colonoscopy pending capsule ( possibly AVM) ; due to CYNDI and low BP stopped Hydralazine /norvasc /cardura and torsemide     8/2018 GFR 34 %     6/2019 d/c fenofibrate ( GFR 33 %) d/c PPI     10/2019 GI bleed s/p 4 u PRBC and s/p EGD and colonoscopy      Review of Systems   Constitutional: Negative.  Negative for activity change, appetite change, chills, diaphoresis, fatigue and fever.   HENT: Negative.  Negative for congestion and trouble swallowing.    Eyes: Negative.    Respiratory: Negative.  Negative for cough, chest tightness, shortness of breath and wheezing.    Cardiovascular: Positive for leg swelling. Negative for chest pain and palpitations.   Gastrointestinal: Negative.  Negative for abdominal distention, abdominal pain, nausea and vomiting.   Genitourinary: Negative.  Negative for decreased urine volume, difficulty urinating, dysuria, enuresis, flank pain, frequency, hematuria, penile swelling, scrotal swelling and urgency.   Musculoskeletal: Negative.  Negative for arthralgias, back pain, joint swelling and neck pain.   Skin: Negative for rash.   Neurological: Negative for tremors, seizures and headaches.        He has numbness and  "tingling in both feet occasional pain at nighttime   Psychiatric/Behavioral: Negative.  Negative for confusion and sleep disturbance. The patient is not nervous/anxious.        Objective:       Vitals:    10/10/19 1102   BP: (!) 140/80   Pulse: 90   Resp: 16   Weight: 89.9 kg (198 lb 3.1 oz)   Height: 5' 4" (1.626 m)     Weight loss 10 ib ( 210 ) 3/2017    Wt loss 16 ib (200) 6/2017     10/2017 stable weight     8/2018 weight 205     1/2019 210 ib    10/2019 198 ib     Physical Exam      Constitutional: He is oriented to person, place, and time. He appears well-developed and well-nourished. No distress.   HENT:   Head: Normocephalic.   Eyes: Conjunctivae and EOM are normal. Pupils are equal, round, and reactive to light. No scleral icterus.   Neck: Normal range of motion. Neck supple. No JVD present. No thyromegaly present.   Cardiovascular: Normal rate, regular rhythm, normal heart sounds and intact distal pulses. Exam reveals no gallop and no friction rub.   No murmur heard.  Loud P2   Pulmonary/Chest: Effort normal and breath sounds normal. No stridor. No respiratory distress. He has no wheezes. He has no rales. He exhibits no tenderness.   Abdominal: Soft. Bowel sounds are normal. He exhibits no distension and no mass. There is no tenderness. There is no rebound and no guarding.   morbid obesity; positive truncal obesity   Musculoskeletal: Normal range of motion. He exhibits trace edema improved   Cannot rule out cholesterol emboli   Neurological: He is alert and oriented to person, place, and time. He has normal reflexes. No cranial nerve deficit. He exhibits normal muscle tone. Coordination normal.   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 No rash noted. He is not diaphoretic. No erythema. No pallor.   Cholesterol emboli in Feet bilaterally    Psychiatric: He has a normal mood and affect. His behavior is normal. Judgment and thought content " normal.       Lab Results   Component Value Date    CREATININE 2.5 (H) 10/04/2019    BUN 32 (H) 10/04/2019     10/04/2019    K 3.7 10/04/2019     10/04/2019    CO2 28 10/04/2019     Lab Results   Component Value Date    WBC 7.04 10/04/2019    HGB 9.9 (L) 10/04/2019    HCT 33.6 (L) 10/04/2019    MCV 99 (H) 10/04/2019     (H) 10/04/2019     Lab Results   Component Value Date    .0 (H) 10/04/2019    CALCIUM 8.9 10/04/2019    PHOS 4.1 10/04/2019     Lab Results   Component Value Date    URICACID 8.1 (H) 12/01/2014        no proetinuria       Cystatin C GFR 30 % 12/2016 on Creatinine of 2.5    1/2018 27% ---34% in august 2018       Assessment:       1. CKD (chronic kidney disease) stage 4, GFR 15-29 ml/min    2. Localized edema    3. Atherosclerotic AUGUSTUS (renal artery stenosis), bilateral     4. Essential hypertension    5. Vitamin D deficiency        Plan:               1.  Chronic kidney disease stage III : likely complications of Dye and cholesterol emboli ;  Keep plavix for now ; no heavy proteinuria; GFR 27---33 % ; no ACEI for now ; PTH is normal.      2.  Hypertension: dr. Garcia :controlled     3.  Diabetic retinopathy:  eye examination with ophthalmologist yearly ; has had history of low sugars but none below 70      4.  Carotid bruit: USD -gualberto  carotid stenosis: on aspirin once a day for stroke prevention    5. Edema in LE [place]: on cardura and Norvasc off procardia : much better now     6. Vit D def: Vit D weekly     7. Prostate cancer:  High PSA s/p  second Bx follow up with Dr. Mcmahan      8. Anemia : multiple GI bleed in 2018 and 09/2019 . Check iron on follow up          For contact call : Abbey Jesus [phone]         Follow up 3-4 months    Cc Dr. Angel Palacios; dr. Jose Ponce MD

## 2020-02-17 ENCOUNTER — LAB VISIT (OUTPATIENT)
Dept: LAB | Facility: HOSPITAL | Age: 85
End: 2020-02-17
Attending: INTERNAL MEDICINE
Payer: MEDICARE

## 2020-02-17 DIAGNOSIS — E55.9 VITAMIN D DEFICIENCY: ICD-10-CM

## 2020-02-17 DIAGNOSIS — I70.1 ATHEROSCLEROTIC RAS (RENAL ARTERY STENOSIS), BILATERAL: ICD-10-CM

## 2020-02-17 DIAGNOSIS — N18.4 CKD (CHRONIC KIDNEY DISEASE) STAGE 4, GFR 15-29 ML/MIN: ICD-10-CM

## 2020-02-17 DIAGNOSIS — I10 ESSENTIAL HYPERTENSION: ICD-10-CM

## 2020-02-17 DIAGNOSIS — R60.0 LOCALIZED EDEMA: ICD-10-CM

## 2020-02-17 LAB
BILIRUB UR QL STRIP: NEGATIVE
CLARITY UR REFRACT.AUTO: CLEAR
COLOR UR AUTO: YELLOW
CREAT UR-MCNC: 97 MG/DL (ref 23–375)
GLUCOSE UR QL STRIP: NEGATIVE
HGB UR QL STRIP: NEGATIVE
KETONES UR QL STRIP: NEGATIVE
LEUKOCYTE ESTERASE UR QL STRIP: NEGATIVE
NITRITE UR QL STRIP: NEGATIVE
PH UR STRIP: 6 [PH] (ref 5–8)
PROT UR QL STRIP: NEGATIVE
PROT UR-MCNC: <7 MG/DL (ref 0–15)
PROT/CREAT UR: NORMAL MG/G{CREAT} (ref 0–0.2)
SP GR UR STRIP: 1.01 (ref 1–1.03)
URN SPEC COLLECT METH UR: NORMAL

## 2020-02-17 PROCEDURE — 81003 URINALYSIS AUTO W/O SCOPE: CPT

## 2020-02-17 PROCEDURE — 82570 ASSAY OF URINE CREATININE: CPT

## 2020-02-25 ENCOUNTER — OFFICE VISIT (OUTPATIENT)
Dept: NEPHROLOGY | Facility: CLINIC | Age: 85
End: 2020-02-25
Payer: MEDICARE

## 2020-02-25 VITALS
BODY MASS INDEX: 35.49 KG/M2 | HEART RATE: 76 BPM | HEIGHT: 64 IN | DIASTOLIC BLOOD PRESSURE: 70 MMHG | SYSTOLIC BLOOD PRESSURE: 144 MMHG | WEIGHT: 207.88 LBS

## 2020-02-25 DIAGNOSIS — I10 ESSENTIAL HYPERTENSION: ICD-10-CM

## 2020-02-25 DIAGNOSIS — N18.4 CKD (CHRONIC KIDNEY DISEASE) STAGE 4, GFR 15-29 ML/MIN: Primary | ICD-10-CM

## 2020-02-25 DIAGNOSIS — I70.1 ATHEROSCLEROTIC RAS (RENAL ARTERY STENOSIS), BILATERAL: ICD-10-CM

## 2020-02-25 DIAGNOSIS — E55.9 VITAMIN D DEFICIENCY: ICD-10-CM

## 2020-02-25 DIAGNOSIS — C61 PROSTATE CANCER: ICD-10-CM

## 2020-02-25 DIAGNOSIS — R60.0 LOCALIZED EDEMA: ICD-10-CM

## 2020-02-25 PROCEDURE — 99999 PR PBB SHADOW E&M-EST. PATIENT-LVL III: CPT | Mod: PBBFAC,,, | Performed by: INTERNAL MEDICINE

## 2020-02-25 PROCEDURE — 99214 OFFICE O/P EST MOD 30 MIN: CPT | Mod: 25,S$PBB,, | Performed by: INTERNAL MEDICINE

## 2020-02-25 PROCEDURE — 76775 US EXAM ABDO BACK WALL LIM: CPT | Mod: PBBFAC | Performed by: INTERNAL MEDICINE

## 2020-02-25 PROCEDURE — 99999 PR PBB SHADOW E&M-EST. PATIENT-LVL III: ICD-10-PCS | Mod: PBBFAC,,, | Performed by: INTERNAL MEDICINE

## 2020-02-25 PROCEDURE — 99214 PR OFFICE/OUTPT VISIT, EST, LEVL IV, 30-39 MIN: ICD-10-PCS | Mod: 25,S$PBB,, | Performed by: INTERNAL MEDICINE

## 2020-02-25 PROCEDURE — 99213 OFFICE O/P EST LOW 20 MIN: CPT | Mod: PBBFAC,25 | Performed by: INTERNAL MEDICINE

## 2020-02-25 PROCEDURE — 76775 US EXAM ABDO BACK WALL LIM: CPT | Mod: 26,S$PBB,, | Performed by: INTERNAL MEDICINE

## 2020-02-25 PROCEDURE — 76775 PR  US, RETROPERITNL ABD,  LTD: ICD-10-PCS | Mod: 26,S$PBB,, | Performed by: INTERNAL MEDICINE

## 2020-02-25 RX ORDER — PANTOPRAZOLE SODIUM 40 MG/1
TABLET, DELAYED RELEASE ORAL
COMMUNITY
Start: 2020-01-05

## 2020-02-25 NOTE — PROGRESS NOTES
Subjective:       Patient ID: Pedro Pro is a 84 y.o.   male who presents for follow up  evaluation of Chronic Kidney Disease    Anemia   There has been no abdominal pain, confusion, fever or palpitations.   Hypertension   Pertinent negatives include no chest pain, headaches, neck pain, palpitations or shortness of breath.   Edema   Pertinent negatives include no abdominal pain, arthralgias, chest pain, chills, congestion, coughing, diaphoresis, fatigue, fever, headaches, joint swelling, nausea, neck pain, rash or vomiting.   Patient is a 84-year-old male from [facility]; does not like doctors was  diagnosed with type II diabetes in  ; was found to have a creatinine 1.8 ; work showed diabetic nephropathy.  He does not speak English he had a friend who was translating for him.  From the history of present illness I gather was that he has had high blood pressure and multiple family members with severe diabetes and one of the siblings  of kidney failure ; he never got himself checked out;    10/2014 had scalp shingles s/p pain and meds     2014 he had  angio for PAD for Dr. Garcia repeat on both legs in 2015 and 2015 s/p PTCA     3/2015 his creatinine went up to 2.8 with acute kidney injury ; stopped  his ACE inhibitor ;  prostate biopsy c/w Prostate cancer pending staging and treatment options     2015 ACE inhibitor continued to be stopped, creatinine came down to 2.1, added calcium channel blocker for blood pressure control and hydrochlorothiazide for blood pressure and edema    2015 comes back for follow-up creatinine is stable at 2.1; hydrochlorothiazide increased to 25 mg to help treat edema and hypertension    10/2015 Norvasc 10 mg increased by Dr. Garcia     3/2016 High PSA dr. Kamara bx proven prostate cancer : no Rx since PSA stable pending 2nd bx     2016 creatinine higher 2.5     2016 GFR 30 % pm cystatin C 2nd prostate Bx shows stable prostate cancer     March  2017 creatinine was 2.4 GFR unchanged from 30%.  Norvasc.  Since the patient was on Norvasc and Procardia.  Hydrocodone thiazide added for edema.  Cardura 4 mg added for blood pressure control      June 2017 patient comes back for follow-up with a creatinine of 3.1, approximately GFR 20-25%.  Swelling of the legs and eyes.  Status post ophthalmology appointment in May 2017 showing no retinopathy.  Due to severe swelling of the legs we stopped the Procardia.  Acute kidney injury was attributed to over medications because of combination of Procardia and Norvasc.  6/21/17 creatinine down to 2.6 off Procardia    1/2018 Cyndi is better ; GFR 27%     2/2018 MRI of prostate -gualberto for cancer     5/2018 GI bleed Hemoglobin down to 4.4 s/p 4 units PRBC and GI EGD and colonoscopy pending capsule ( possibly AVM) ; due to CYNDI and low BP stopped Hydralazine /norvasc /cardura and torsemide     8/2018 GFR 34 %     6/2019 d/c fenofibrate ( GFR 33 %) d/c PPI     10/2019 GI bleed s/p 4 u PRBC and s/p EGD and colonoscopy      Review of Systems   Constitutional: Negative.  Negative for activity change, appetite change, chills, diaphoresis, fatigue and fever.   HENT: Negative.  Negative for congestion and trouble swallowing.    Eyes: Negative.    Respiratory: Negative.  Negative for cough, chest tightness, shortness of breath and wheezing.    Cardiovascular: Positive for leg swelling. Negative for chest pain and palpitations.   Gastrointestinal: Negative.  Negative for abdominal distention, abdominal pain, nausea and vomiting.   Genitourinary: Negative.  Negative for decreased urine volume, difficulty urinating, dysuria, enuresis, flank pain, frequency, hematuria, penile swelling, scrotal swelling and urgency.   Musculoskeletal: Negative.  Negative for arthralgias, back pain, joint swelling and neck pain.   Skin: Negative for rash.   Neurological: Negative for tremors, seizures and headaches.        He has numbness and tingling in both feet  "occasional pain at nighttime   Psychiatric/Behavioral: Negative.  Negative for confusion and sleep disturbance. The patient is not nervous/anxious.        Objective:       Vitals:    02/25/20 1120   BP: (!) 144/70   Pulse: 76   Weight: 94.3 kg (207 lb 14.3 oz)   Height: 5' 4" (1.626 m)     Weight loss 10 ib ( 210 ) 3/2017    Wt loss 16 ib (200) 6/2017     10/2017 stable weight     8/2018 weight 205     1/2019 210 ib    10/2019 198 ib     February 2020 weight is 207 lb    Physical Exam      Constitutional: He is oriented to person, place, and time. He appears well-developed and well-nourished. No distress.   HENT:   Head: Normocephalic.   Eyes: Conjunctivae and EOM are normal. Pupils are equal, round, and reactive to light. No scleral icterus.   Neck: Normal range of motion. Neck supple. No JVD present. No thyromegaly present.   Cardiovascular: Normal rate, regular rhythm, normal heart sounds and intact distal pulses. Exam reveals no gallop and no friction rub.   No murmur heard.  Loud P2   Pulmonary/Chest: Effort normal and breath sounds normal. No stridor. No respiratory distress. He has no wheezes. He has no rales. He exhibits no tenderness.   Abdominal: Soft. Bowel sounds are normal. He exhibits no distension and no mass. There is no tenderness. There is no rebound and no guarding.   morbid obesity; positive truncal obesity   Musculoskeletal: Normal range of motion. He exhibits trace edema improved   Cannot rule out cholesterol emboli   Neurological: He is alert and oriented to person, place, and time. He has normal reflexes. No cranial nerve deficit. He exhibits normal muscle tone. Coordination normal.   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 No rash noted. He is not diaphoretic. No erythema. No pallor.   Cholesterol emboli in Feet bilaterally    Psychiatric: He has a normal mood and affect. His behavior is normal. Judgment and thought content " normal.       Right kidney           Left kidney       Lab Results   Component Value Date    CREATININE 2.6 (H) 02/17/2020    BUN 41 (H) 02/17/2020     02/17/2020    K 3.8 02/17/2020    CL 99 02/17/2020    CO2 30 (H) 02/17/2020     Lab Results   Component Value Date    WBC 8.76 02/17/2020    HGB 13.1 (L) 02/17/2020    HCT 41.0 02/17/2020    MCV 97 02/17/2020     02/17/2020     Lab Results   Component Value Date    .0 (H) 02/17/2020    CALCIUM 9.0 02/17/2020    PHOS 3.7 02/17/2020     Lab Results   Component Value Date    URICACID 8.1 (H) 12/01/2014        no proetinuria       Cystatin C GFR 30 % 12/2016 on Creatinine of 2.5    1/2018 27% ---34% in august 2018     The bilateral kidneys are normal in size, echotexture, and cortical thickness. No evidence of hydronephrosis, solid mass, or calculus. No perinephric fluid collections.    The right kidney measures 11 cm in length.    The left kidney measures 12 cm in length.    The urinary bladder is within normal limits.    Impression: Normal renal ultrasound without evidence of hydronephrosis        Assessment:       1. CKD (chronic kidney disease) stage 4, GFR 15-29 ml/min    2. Localized edema    3. Atherosclerotic AUGUSTUS (renal artery stenosis), bilateral     4. Essential hypertension    5. Vitamin D deficiency    6. Prostate cancer        Plan:               1.  Chronic kidney disease stage III : likely complications of IV dye and cholesterol emboli ;  Keep plavix for now ; no heavy proteinuria; GFR 25 % ; no ACEI for now ; PTH is normal.  POCUS -gualberto     2.  Hypertension: dr. Garcia :controlled     3.  Diabetic retinopathy:  eye examination with ophthalmologist yearly ; has had history of low sugars but none below 70      4.  Carotid bruit: USD -gualberto  carotid stenosis: on aspirin once a day for stroke prevention    5. Edema in LE Brown: on cardura and Norvasc off procardia : much better now     6. Vit D def: Vit D weekly     7. Prostate cancer:  High PSA  s/p  second Bx follow up with Dr. Mcmahan ( doing ok )       8. multiple GI bleed in 2018 and 09/2019 .  No more GI bleed.  Hemoglobin is greater than 13 at this time.           For contact call : Abbey Jesus [phone]         Follow up 3-4 months    Cc Dr. Angel Palacios; dr. Jose Ponce MD

## 2020-08-07 ENCOUNTER — LAB VISIT (OUTPATIENT)
Dept: LAB | Facility: HOSPITAL | Age: 85
End: 2020-08-07
Attending: INTERNAL MEDICINE
Payer: MEDICARE

## 2020-08-07 DIAGNOSIS — I70.1 ATHEROSCLEROTIC RAS (RENAL ARTERY STENOSIS), BILATERAL: ICD-10-CM

## 2020-08-07 DIAGNOSIS — I10 ESSENTIAL HYPERTENSION: ICD-10-CM

## 2020-08-07 DIAGNOSIS — E55.9 VITAMIN D DEFICIENCY: ICD-10-CM

## 2020-08-07 DIAGNOSIS — R60.0 LOCALIZED EDEMA: ICD-10-CM

## 2020-08-07 DIAGNOSIS — C61 PROSTATE CANCER: ICD-10-CM

## 2020-08-07 DIAGNOSIS — N18.4 CKD (CHRONIC KIDNEY DISEASE) STAGE 4, GFR 15-29 ML/MIN: ICD-10-CM

## 2020-08-07 LAB
ALBUMIN SERPL BCP-MCNC: 3.9 G/DL (ref 3.5–5.2)
ANION GAP SERPL CALC-SCNC: 12 MMOL/L (ref 8–16)
BASOPHILS # BLD AUTO: 0.02 K/UL (ref 0–0.2)
BASOPHILS NFR BLD: 0.3 % (ref 0–1.9)
BUN SERPL-MCNC: 40 MG/DL (ref 8–23)
CALCIUM SERPL-MCNC: 9.1 MG/DL (ref 8.7–10.5)
CHLORIDE SERPL-SCNC: 100 MMOL/L (ref 95–110)
CO2 SERPL-SCNC: 28 MMOL/L (ref 23–29)
CREAT SERPL-MCNC: 2.7 MG/DL (ref 0.5–1.4)
DIFFERENTIAL METHOD: ABNORMAL
EOSINOPHIL # BLD AUTO: 0.3 K/UL (ref 0–0.5)
EOSINOPHIL NFR BLD: 4.4 % (ref 0–8)
ERYTHROCYTE [DISTWIDTH] IN BLOOD BY AUTOMATED COUNT: 13.5 % (ref 11.5–14.5)
EST. GFR  (AFRICAN AMERICAN): 24 ML/MIN/1.73 M^2
EST. GFR  (NON AFRICAN AMERICAN): 21 ML/MIN/1.73 M^2
GLUCOSE SERPL-MCNC: 114 MG/DL (ref 70–110)
HCT VFR BLD AUTO: 38.7 % (ref 40–54)
HGB BLD-MCNC: 13 G/DL (ref 14–18)
IMM GRANULOCYTES # BLD AUTO: 0.05 K/UL (ref 0–0.04)
IMM GRANULOCYTES NFR BLD AUTO: 0.7 % (ref 0–0.5)
LYMPHOCYTES # BLD AUTO: 1.4 K/UL (ref 1–4.8)
LYMPHOCYTES NFR BLD: 19.2 % (ref 18–48)
MCH RBC QN AUTO: 31.9 PG (ref 27–31)
MCHC RBC AUTO-ENTMCNC: 33.6 G/DL (ref 32–36)
MCV RBC AUTO: 95 FL (ref 82–98)
MONOCYTES # BLD AUTO: 0.7 K/UL (ref 0.3–1)
MONOCYTES NFR BLD: 9.5 % (ref 4–15)
NEUTROPHILS # BLD AUTO: 4.9 K/UL (ref 1.8–7.7)
NEUTROPHILS NFR BLD: 65.9 % (ref 38–73)
NRBC BLD-RTO: 0 /100 WBC
PHOSPHATE SERPL-MCNC: 3.8 MG/DL (ref 2.7–4.5)
PLATELET # BLD AUTO: 241 K/UL (ref 150–350)
PMV BLD AUTO: 9.6 FL (ref 9.2–12.9)
POTASSIUM SERPL-SCNC: 4 MMOL/L (ref 3.5–5.1)
PTH-INTACT SERPL-MCNC: 128.7 PG/ML (ref 9–77)
RBC # BLD AUTO: 4.07 M/UL (ref 4.6–6.2)
SODIUM SERPL-SCNC: 140 MMOL/L (ref 136–145)
WBC # BLD AUTO: 7.45 K/UL (ref 3.9–12.7)

## 2020-08-07 PROCEDURE — 36415 COLL VENOUS BLD VENIPUNCTURE: CPT

## 2020-08-07 PROCEDURE — 85025 COMPLETE CBC W/AUTO DIFF WBC: CPT

## 2020-08-07 PROCEDURE — 80069 RENAL FUNCTION PANEL: CPT

## 2020-08-07 PROCEDURE — 83970 ASSAY OF PARATHORMONE: CPT

## 2020-08-11 ENCOUNTER — OFFICE VISIT (OUTPATIENT)
Dept: NEPHROLOGY | Facility: CLINIC | Age: 85
End: 2020-08-11
Payer: MEDICARE

## 2020-08-11 VITALS
SYSTOLIC BLOOD PRESSURE: 152 MMHG | HEIGHT: 65 IN | HEART RATE: 80 BPM | WEIGHT: 208.13 LBS | BODY MASS INDEX: 34.68 KG/M2 | DIASTOLIC BLOOD PRESSURE: 76 MMHG

## 2020-08-11 DIAGNOSIS — N18.4 CKD (CHRONIC KIDNEY DISEASE) STAGE 4, GFR 15-29 ML/MIN: Primary | ICD-10-CM

## 2020-08-11 DIAGNOSIS — R60.0 LOCALIZED EDEMA: ICD-10-CM

## 2020-08-11 DIAGNOSIS — I70.1 ATHEROSCLEROTIC RAS (RENAL ARTERY STENOSIS), BILATERAL: ICD-10-CM

## 2020-08-11 DIAGNOSIS — E55.9 VITAMIN D DEFICIENCY: ICD-10-CM

## 2020-08-11 DIAGNOSIS — I10 ESSENTIAL HYPERTENSION: ICD-10-CM

## 2020-08-11 DIAGNOSIS — C61 PROSTATE CANCER: ICD-10-CM

## 2020-08-11 LAB
CYSTATIN C SERPL-MCNC: 1.99 MG/L
GFR/BSA.PRED SERPLBLD CYS-BASED-ARV: 28 ML/MIN/BSA

## 2020-08-11 PROCEDURE — 99999 PR PBB SHADOW E&M-EST. PATIENT-LVL III: CPT | Mod: PBBFAC,,, | Performed by: INTERNAL MEDICINE

## 2020-08-11 PROCEDURE — 99213 OFFICE O/P EST LOW 20 MIN: CPT | Mod: PBBFAC | Performed by: INTERNAL MEDICINE

## 2020-08-11 PROCEDURE — 99215 PR OFFICE/OUTPT VISIT, EST, LEVL V, 40-54 MIN: ICD-10-PCS | Mod: S$PBB,,, | Performed by: INTERNAL MEDICINE

## 2020-08-11 PROCEDURE — 99999 PR PBB SHADOW E&M-EST. PATIENT-LVL III: ICD-10-PCS | Mod: PBBFAC,,, | Performed by: INTERNAL MEDICINE

## 2020-08-11 PROCEDURE — 99215 OFFICE O/P EST HI 40 MIN: CPT | Mod: S$PBB,,, | Performed by: INTERNAL MEDICINE

## 2020-08-11 RX ORDER — HYDRALAZINE HYDROCHLORIDE 50 MG/1
50 TABLET, FILM COATED ORAL EVERY 12 HOURS
Qty: 60 TABLET | Refills: 2 | Status: SHIPPED | OUTPATIENT
Start: 2020-08-11 | End: 2020-11-09

## 2020-08-11 NOTE — PATIENT INSTRUCTIONS
1.  Chronic kidney disease stage IV : likely complications of IV dye and cholesterol emboli ;  Keep plavix for now ; no heavy proteinuria; GFR 28 % ; no ACEI for now ; PTH is normal.  POCUS -gualberto     2.  Hypertension: dr. Garcia :  No changes are made today. Refill Hydralazine    3.  Diabetic retinopathy:  eye examination with ophthalmologist yearly ; has had history of low sugars but none below 70      4.  Carotid bruit: USD -gualbetro  carotid stenosis: on aspirin once a day for stroke prevention    5. Edema in LE [place]: on cardura and Norvasc off procardia : much better now     6. Vit D def: Vit D weekly     7. Prostate cancer:  High PSA s/p  second Bx follow up with Dr. Mcmahan ( doing ok )       8. multiple GI bleed in 2018 and 09/2019 .  No more GI bleed.  Hemoglobin is greater than 13 at this time.

## 2020-08-11 NOTE — PROGRESS NOTES
Subjective:       Patient ID: Pedro Pro is a 84 y.o.   male who presents for follow up  evaluation of Chronic Kidney Disease    Anemia  There has been no abdominal pain, confusion, fever or palpitations.   Hypertension  Pertinent negatives include no chest pain, headaches, neck pain, palpitations or shortness of breath.   Edema  Pertinent negatives include no abdominal pain, arthralgias, chest pain, chills, congestion, coughing, diaphoresis, fatigue, fever, headaches, joint swelling, nausea, neck pain, rash or vomiting.   Patient is a 84-year-old male [facility]; does not like doctors was  diagnosed with type II diabetes in  ; was found to have a creatinine 1.8 ; work showed diabetic nephropathy.  He does not speak English he had a friend who was translating for him.  From the history of present illness I gather was that he has had high blood pressure and multiple family members with severe diabetes and one of the siblings  of kidney failure ; he never got himself checked out;    10/2014 had scalp shingles s/p pain and meds     2014 he had  angio for PAD for Dr. Garcia repeat on both legs in 2015 and 2015 s/p PTCA     3/2015 his creatinine went up to 2.8 with acute kidney injury ; stopped  his ACE inhibitor ;  prostate biopsy c/w Prostate cancer pending staging and treatment options     2015 ACE inhibitor continued to be stopped, creatinine came down to 2.1, added calcium channel blocker for blood pressure control and hydrochlorothiazide for blood pressure and edema    2015 comes back for follow-up creatinine is stable at 2.1; hydrochlorothiazide increased to 25 mg to help treat edema and hypertension    10/2015 Norvasc 10 mg increased by Dr. Garcia     3/2016 High PSA dr. Kamara bx proven prostate cancer : no Rx since PSA stable pending 2nd bx     2016 creatinine higher 2.5     2016 GFR 30 % pm cystatin C 2nd prostate Bx shows stable prostate cancer     March  2017 creatinine was 2.4 GFR unchanged from 30%.  Norvasc.  Since the patient was on Norvasc and Procardia.  Hydrocodone thiazide added for edema.  Cardura 4 mg added for blood pressure control      June 2017 patient comes back for follow-up with a creatinine of 3.1, approximately GFR 20-25%.  Swelling of the legs and eyes.  Status post ophthalmology appointment in May 2017 showing no retinopathy.  Due to severe swelling of the legs we stopped the Procardia.  Acute kidney injury was attributed to over medications because of combination of Procardia and Norvasc.  6/21/17 creatinine down to 2.6 off Procardia    1/2018 Cyndi is better ; GFR 27%     2/2018 MRI of prostate -gualberto for cancer     5/2018 GI bleed Hemoglobin down to 4.4 s/p 4 units PRBC and GI EGD and colonoscopy pending capsule ( possibly AVM) ; due to CYNDI and low BP stopped Hydralazine /norvasc /cardura and torsemide     8/2018 GFR 34 %     6/2019 d/c fenofibrate ( GFR 33 %) d/c PPI     10/2019 GI bleed s/p 4 u PRBC and s/p EGD and colonoscopy      Review of Systems   Constitutional: Negative.  Negative for activity change, appetite change, chills, diaphoresis, fatigue and fever.   HENT: Negative.  Negative for congestion and trouble swallowing.    Eyes: Negative.    Respiratory: Negative.  Negative for cough, chest tightness, shortness of breath and wheezing.    Cardiovascular: Positive for leg swelling. Negative for chest pain and palpitations.   Gastrointestinal: Negative.  Negative for abdominal distention, abdominal pain, nausea and vomiting.   Genitourinary: Negative.  Negative for decreased urine volume, difficulty urinating, dysuria, enuresis, flank pain, frequency, hematuria, penile swelling, scrotal swelling and urgency.   Musculoskeletal: Negative.  Negative for arthralgias, back pain, joint swelling and neck pain.   Skin: Negative for rash.   Neurological: Negative for tremors, seizures and headaches.        He has numbness and tingling in both feet  "occasional pain at nighttime   Psychiatric/Behavioral: Negative.  Negative for confusion and sleep disturbance. The patient is not nervous/anxious.        Objective:       Vitals:    08/11/20 1242   BP: (!) 152/76   Pulse: 80   Weight: 94.4 kg (208 lb 1.8 oz)   Height: 5' 5" (1.651 m)     Weight loss 10 ib ( 210 ) 3/2017    Wt loss 16 ib (200) 6/2017     10/2017 stable weight     8/2018 weight 205     1/2019 210 ib    10/2019 198 ib     February 2020 weight is 207 lb---08/2020 208 ib    Physical Exam      Constitutional: He is oriented to person, place, and time. He appears well-developed and well-nourished. No distress.   HENT:   Head: Normocephalic.   Eyes: Conjunctivae and EOM are normal. Pupils are equal, round, and reactive to light. No scleral icterus.   Neck: Normal range of motion. Neck supple. No JVD present. No thyromegaly present.   Cardiovascular: Normal rate, regular rhythm, normal heart sounds and intact distal pulses. Exam reveals no gallop and no friction rub.   No murmur heard.  Loud P2   Pulmonary/Chest: Effort normal and breath sounds normal. No stridor. No respiratory distress. He has no wheezes. He has no rales. He exhibits no tenderness.   Abdominal: Soft. Bowel sounds are normal. He exhibits no distension and no mass. There is no tenderness. There is no rebound and no guarding.   morbid obesity; positive truncal obesity   Musculoskeletal: Normal range of motion. He exhibits trace edema improved   Cannot rule out cholesterol emboli   Neurological: He is alert and oriented to person, place, and time. He has normal reflexes. No cranial nerve deficit. He exhibits normal muscle tone. Coordination normal.   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 No rash noted. He is not diaphoretic. No erythema. No pallor.   Cholesterol emboli in Feet bilaterally    Psychiatric: He has a normal mood and affect. His behavior is normal. Judgment and " thought content normal.         Lab Results   Component Value Date    CREATININE 2.7 (H) 08/07/2020    BUN 40 (H) 08/07/2020     08/07/2020    K 4.0 08/07/2020     08/07/2020    CO2 28 08/07/2020     Lab Results   Component Value Date    WBC 7.45 08/07/2020    HGB 13.0 (L) 08/07/2020    HCT 38.7 (L) 08/07/2020    MCV 95 08/07/2020     08/07/2020     Lab Results   Component Value Date    .7 (H) 08/07/2020    CALCIUM 9.1 08/07/2020    PHOS 3.8 08/07/2020     Lab Results   Component Value Date    URICACID 8.1 (H) 12/01/2014               Assessment:       1. CKD (chronic kidney disease) stage 4, GFR 15-29 ml/min    2. Localized edema    3. Atherosclerotic AUGUSTUS (renal artery stenosis), bilateral     4. Essential hypertension    5. Vitamin D deficiency    6. Prostate cancer        Plan:               1.  Chronic kidney disease stage IV : likely complications of IV dye and cholesterol emboli ;  Keep plavix for now ; no heavy proteinuria; GFR 28 % ; no ACEI for now ; PTH is normal.  POCUS -gualberto     2.  Hypertension: dr. Garcia :  No changes are made today. Refill Hydralazine    3.  Diabetic retinopathy:  eye examination with ophthalmologist yearly ; has had history of low sugars but none below 70      4.  Carotid bruit: USD -gualberto  carotid stenosis: on aspirin once a day for stroke prevention    5. Edema in LE Brown: on cardura and Norvasc off procardia : much better now     6. Vit D def: Vit D weekly     7. Prostate cancer:  High PSA s/p  second Bx follow up with Dr. Mcmahan ( doing ok )       8. multiple GI bleed in 2018 and 09/2019 .  No more GI bleed.  Hemoglobin is greater than 13 at this time.           For contact call : Abbey Jesus [phone]         Follow up 3-4 months    Cc Dr. Angel Palacios; dr. Jose Ponce MD

## 2020-11-02 ENCOUNTER — PATIENT MESSAGE (OUTPATIENT)
Dept: NEPHROLOGY | Facility: CLINIC | Age: 85
End: 2020-11-02

## 2020-11-02 ENCOUNTER — NURSE TRIAGE (OUTPATIENT)
Dept: ADMINISTRATIVE | Facility: CLINIC | Age: 85
End: 2020-11-02

## 2020-11-02 NOTE — TELEPHONE ENCOUNTER
Bs today was 200 fasting. And yesterday it was 94 fasting. Last night above 200, and the two nights before it was above 300 2 hours after eating. Cg wants to know if he can take an extra glimepiride later in the evening if his blood sugar is elevated. And she also discovered he had been trying to take his wife metformin. Cg wants to have blood sugar parameters. Please call and advise [phone]. Care advice recommend pt/cg receives a call back from Md office within 1 hour.     Reason for Disposition   Blood glucose > 300 mg/dL (16.7 mmol/L) AND two or more times in a row    Additional Information   Negative: Unconscious or difficult to awaken   Negative: Acting confused (e.g., disoriented, slurred speech)   Negative: Very weak (can't stand)   Negative: Sounds like a life-threatening emergency to the triager   Negative: Vomiting and signs of dehydration (e.g., very dry mouth, lightheaded, dark urine)   Negative: Blood glucose > 240 mg/dL (13.3 mmol/L) and rapid breathing   Negative: Blood glucose > 500 mg/dL (27.8 mmol/L)   Negative: Blood glucose > 240 mg/dL (13.3 mmol/L) AND urine ketones moderate-large (or more than 1+)   Negative: Blood glucose > 240 mg/dL (13.3 mmol/L) and blood ketones > 1.4 mmol/L   Negative: Blood glucose > 240 mg/dL (13.3 mmol/L) AND vomiting AND unable to check for ketones (in blood or urine)   Negative: Vomiting lasting > 4 hours   Negative: Patient sounds very sick or weak to the triager   Negative: Fever > 100.4 F (38.0 C)   Negative: Caller has URGENT medication or insulin pump question and triager unable to answer question   Negative: Blood glucose > 400 mg/dL (22.2 mmol/L)    Protocols used: DIABETES - HIGH BLOOD SUGAR-A-OH

## 2020-11-17 ENCOUNTER — TELEPHONE (OUTPATIENT)
Dept: NEPHROLOGY | Facility: CLINIC | Age: 85
End: 2020-11-17

## 2020-11-17 NOTE — TELEPHONE ENCOUNTER
Will call back to give fax number for pts lab orders to be sent as he is in [place] since his wife passed away. They will be keeping his appt in dec 1/..11/17/2020/1525/sf

## 2020-11-17 NOTE — TELEPHONE ENCOUNTER
----- Message from Feli Berger sent at 11/17/2020  2:54 PM CST -----  Regarding: lab order  Mi is requesting call back in regards to getting lab orders sent to different facility       [facility] call back at [phone]

## 2020-11-17 NOTE — TELEPHONE ENCOUNTER
----- Message from Amara Oconnell sent at 11/17/2020  3:24 PM CST -----  Contact: pt granddaughter - johnie  Is calling to see if there referral for the pt can be scanned to her email at [email or web address] and can be reached at [phone]

## 2020-11-25 ENCOUNTER — TELEPHONE (OUTPATIENT)
Dept: NEPHROLOGY | Facility: CLINIC | Age: 85
End: 2020-11-25

## 2020-11-25 NOTE — TELEPHONE ENCOUNTER
----- Message from Joan Pulido sent at 11/25/2020  9:57 AM CST -----  Contact: Mi-grand daughter  Mi requesting that the order for lab work and UA be sent to local [facility] in [place]. Fax number [phone], call back number [phone]

## 2020-11-25 NOTE — TELEPHONE ENCOUNTER
----- Message from Heather Hunter sent at 11/25/2020 12:00 PM CST -----  Pt would like to have lab order faxed to [facility] at [phone].    Please call back at [phone].  Md Jenni

## 2020-12-01 ENCOUNTER — OFFICE VISIT (OUTPATIENT)
Dept: NEPHROLOGY | Facility: CLINIC | Age: 85
End: 2020-12-01
Payer: MEDICARE

## 2020-12-01 VITALS
BODY MASS INDEX: 34.97 KG/M2 | SYSTOLIC BLOOD PRESSURE: 150 MMHG | HEIGHT: 64 IN | HEART RATE: 70 BPM | DIASTOLIC BLOOD PRESSURE: 60 MMHG | WEIGHT: 204.81 LBS

## 2020-12-01 DIAGNOSIS — E55.9 VITAMIN D DEFICIENCY: ICD-10-CM

## 2020-12-01 DIAGNOSIS — R60.0 LOCALIZED EDEMA: ICD-10-CM

## 2020-12-01 DIAGNOSIS — I10 ESSENTIAL HYPERTENSION: ICD-10-CM

## 2020-12-01 DIAGNOSIS — I70.1 ATHEROSCLEROTIC RAS (RENAL ARTERY STENOSIS), BILATERAL: ICD-10-CM

## 2020-12-01 DIAGNOSIS — N18.4 CKD (CHRONIC KIDNEY DISEASE) STAGE 4, GFR 15-29 ML/MIN: Primary | ICD-10-CM

## 2020-12-01 PROCEDURE — 99214 OFFICE O/P EST MOD 30 MIN: CPT | Mod: S$GLB,,, | Performed by: INTERNAL MEDICINE

## 2020-12-01 PROCEDURE — 99999 PR PBB SHADOW E&M-EST. PATIENT-LVL IV: ICD-10-PCS | Mod: PBBFAC,,, | Performed by: INTERNAL MEDICINE

## 2020-12-01 PROCEDURE — 99214 PR OFFICE/OUTPT VISIT, EST, LEVL IV, 30-39 MIN: ICD-10-PCS | Mod: S$GLB,,, | Performed by: INTERNAL MEDICINE

## 2020-12-01 PROCEDURE — 99999 PR PBB SHADOW E&M-EST. PATIENT-LVL IV: CPT | Mod: PBBFAC,,, | Performed by: INTERNAL MEDICINE

## 2020-12-01 NOTE — PROGRESS NOTES
Subjective:       Patient ID: Pedro Pro is a 84 y.o.   male who presents for follow up  evaluation of Chronic Kidney Disease    Anemia  There has been no abdominal pain, confusion, fever or palpitations.   Hypertension  Pertinent negatives include no chest pain, headaches, neck pain, palpitations or shortness of breath.   Edema  Pertinent negatives include no abdominal pain, arthralgias, chest pain, chills, congestion, coughing, diaphoresis, fatigue, fever, headaches, joint swelling, nausea, neck pain, rash or vomiting.   Patient is a 84-year-old male from [facility]; does not like doctors was  diagnosed with type II diabetes in  ; was found to have a creatinine 1.8 ; work showed diabetic nephropathy.  He does not speak English he had a friend who was translating for him.  From the history of present illness I gather was that he has had high blood pressure and multiple family members with severe diabetes and one of the siblings  of kidney failure ; he never got himself checked out;    10/2014 had scalp shingles s/p pain and meds     2014 he had  angio for PAD for Dr. Garcia repeat on both legs in 2015 and 2015 s/p PTCA     3/2015 his creatinine went up to 2.8 with acute kidney injury ; stopped  his ACE inhibitor ;  prostate biopsy c/w Prostate cancer pending staging and treatment options     2015 ACE inhibitor continued to be stopped, creatinine came down to 2.1, added calcium channel blocker for blood pressure control and hydrochlorothiazide for blood pressure and edema    2015 comes back for follow-up creatinine is stable at 2.1; hydrochlorothiazide increased to 25 mg to help treat edema and hypertension    10/2015 Norvasc 10 mg increased by Dr. Garcia     3/2016 High PSA dr. Kamara bx proven prostate cancer : no Rx since PSA stable pending 2nd bx     2016 creatinine higher 2.5     2016 GFR 30 % pm cystatin C 2nd prostate Bx shows stable prostate cancer     March  2017 creatinine was 2.4 GFR unchanged from 30%.  Norvasc.  Since the patient was on Norvasc and Procardia.  Hydrocodone thiazide added for edema.  Cardura 4 mg added for blood pressure control      June 2017 patient comes back for follow-up with a creatinine of 3.1, approximately GFR 20-25%.  Swelling of the legs and eyes.  Status post ophthalmology appointment in May 2017 showing no retinopathy.  Due to severe swelling of the legs we stopped the Procardia.  Acute kidney injury was attributed to over medications because of combination of Procardia and Norvasc.  6/21/17 creatinine down to 2.6 off Procardia    1/2018 Cyndi is better ; GFR 27%     2/2018 MRI of prostate -gualberto for cancer     5/2018 GI bleed Hemoglobin down to 4.4 s/p 4 units PRBC and GI EGD and colonoscopy pending capsule ( possibly AVM) ; due to CYNDI and low BP stopped Hydralazine /norvasc /cardura and torsemide     8/2018 GFR 34 %     6/2019 d/c fenofibrate ( GFR 33 %) d/c PPI     10/2019 GI bleed s/p 4 u PRBC and s/p EGD and colonoscopy     12/01/2020 outside labs no proteinuria, creatinine 2.56 potassium 4.2 bicarbonate 30 calcium 9.0 phosphorus 3.8, albumin 4.4 PTH 89 GFR based on Cystatin C is 29%.  Hemoglobin 14.0     Review of Systems   Constitutional: Negative.  Negative for activity change, appetite change, chills, diaphoresis, fatigue and fever.   HENT: Negative.  Negative for congestion and trouble swallowing.    Eyes: Negative.    Respiratory: Negative.  Negative for cough, chest tightness, shortness of breath and wheezing.    Cardiovascular: Positive for leg swelling. Negative for chest pain and palpitations.   Gastrointestinal: Negative.  Negative for abdominal distention, abdominal pain, nausea and vomiting.   Genitourinary: Negative.  Negative for decreased urine volume, difficulty urinating, dysuria, enuresis, flank pain, frequency, hematuria, penile swelling, scrotal swelling and urgency.   Musculoskeletal: Negative.  Negative for  "arthralgias, back pain, joint swelling and neck pain.   Skin: Negative for rash.   Neurological: Negative for tremors, seizures and headaches.        He has numbness and tingling in both feet occasional pain at nighttime   Psychiatric/Behavioral: Negative.  Negative for confusion and sleep disturbance. The patient is not nervous/anxious.        Objective:       Vitals:    12/01/20 1301   BP: (!) 150/60   Pulse: 70   Weight: 92.9 kg (204 lb 12.9 oz)   Height: 5' 4" (1.626 m)     Weight loss 10 ib ( 210 ) 3/2017    Wt loss 16 ib (200) 6/2017     10/2017 stable weight     8/2018 weight 205     1/2019 210 ib    10/2019 198 ib     February 2020 weight is 207 lb---08/2020 208 ib    12/2020 204 ib     Physical Exam      Constitutional: He is oriented to person, place, and time. He appears well-developed and well-nourished. No distress.   HENT:   Head: Normocephalic.   Eyes: Conjunctivae and EOM are normal. Pupils are equal, round, and reactive to light. No scleral icterus.   Neck: Normal range of motion. Neck supple. No JVD present. No thyromegaly present.   Cardiovascular: Normal rate, regular rhythm, normal heart sounds and intact distal pulses. Exam reveals no gallop and no friction rub.   No murmur heard.  Loud P2   Pulmonary/Chest: Effort normal and breath sounds normal. No stridor. No respiratory distress. He has no wheezes. He has no rales. He exhibits no tenderness.   Abdominal: Soft. Bowel sounds are normal. He exhibits no distension and no mass. There is no tenderness. There is no rebound and no guarding.   morbid obesity; positive truncal obesity   Musculoskeletal: Normal range of motion. He exhibits trace edema improved   Cannot rule out cholesterol emboli   Neurological: He is alert and oriented to person, place, and time. He has normal reflexes. No cranial nerve deficit. He exhibits normal muscle tone. Coordination normal.   Peripheral neuropathy likely from diabetes especially in the stocking position "   Skin: Skin is warm and dry. No rash noted. He is not diaphoretic. No erythema. No pallor.   Cholesterol emboli in Feet bilaterally    Psychiatric: He has a normal mood and affect. His behavior is normal. Judgment and thought content normal.         Lab Results   Component Value Date    CREATININE 2.7 (H) 08/07/2020    BUN 40 (H) 08/07/2020     08/07/2020    K 4.0 08/07/2020     08/07/2020    CO2 28 08/07/2020     Lab Results   Component Value Date    WBC 7.45 08/07/2020    HGB 13.0 (L) 08/07/2020    HCT 38.7 (L) 08/07/2020    MCV 95 08/07/2020     08/07/2020     Lab Results   Component Value Date    .7 (H) 08/07/2020    CALCIUM 9.1 08/07/2020    PHOS 3.8 08/07/2020     Lab Results   Component Value Date    URICACID 8.1 (H) 12/01/2014               Assessment:       1. CKD (chronic kidney disease) stage 4, GFR 15-29 ml/min    2. Localized edema    3. Atherosclerotic AUGUSTUS (renal artery stenosis), bilateral     4. Essential hypertension    5. Vitamin D deficiency        Plan:               1.  Chronic kidney disease stage IV : likely complications of IV dye and cholesterol emboli ;  Keep plavix for now ; no heavy proteinuria; GFR 29 % ; no ACEI for now ; PTH is normal.  Renal function overall is stable    2.  Hypertension: dr. Garcia :  No changes are made today.  Overall stable    3.  Diabetic retinopathy:  eye examination with ophthalmologist yearly ; has had history of low sugars but none below 70. Blood sugar > 350 at times       4.  Carotid bruit: USD -gualberto  carotid stenosis: on aspirin once a day for stroke prevention    5. Edema in LE Brown: on cardura and Norvasc off procardia : better now in remission     6. Vit D def: Vit D weekly     7. Prostate cancer:  High PSA s/p  second Bx follow up with Dr. Mcmahan ( doing ok )   MRI -gualberto 2018 ? Follow up     8. multiple GI bleed in 2018 and 09/2019 .  No more GI bleed.  Hemoglobin is greater than 13 at this time.           For contact call :  Abbey Jesus [phone]           dr. Garcia     Follow up in [place]     Markus Ponce MD

## 2020-12-01 NOTE — PATIENT INSTRUCTIONS
1.  Chronic kidney disease stage IV : likely complications of IV dye and cholesterol emboli ;  Keep plavix for now ; no heavy proteinuria; GFR 29 % ; no ACEI for now ; PTH is normal.  Renal function overall is stable    2.  Hypertension: dr. Garcia :  No changes are made today.  Overall stable    3.  Diabetic retinopathy:  eye examination with ophthalmologist yearly ; has had history of low sugars but none below 70. Blood sugar > 350 at times       4.  Carotid bruit: USD -gualberto  carotid stenosis: on aspirin once a day for stroke prevention    5. Edema in LE [place]: on cardura and Norvasc off procardia : better now in remission     6. Vit D def: Vit D weekly     7. Prostate cancer:  High PSA s/p  second Bx follow up with Dr. Mcmahan ( doing ok )   MRI -gualberto 2018 ? Follow up     8. multiple GI bleed in 2018 and 09/2019 .  No more GI bleed.  Hemoglobin is greater than 13 at this time.           For contact call : Abbey Jesus [phone]           dr. Garcia     Follow up in [place]

## 2021-01-15 ENCOUNTER — PATIENT MESSAGE (OUTPATIENT)
Dept: ADMINISTRATIVE | Facility: OTHER | Age: 86
End: 2021-01-15

## 2021-01-25 RX ORDER — DOXAZOSIN 4 MG/1
4 TABLET ORAL NIGHTLY
Qty: 30 TABLET | Refills: 11 | Status: SHIPPED | OUTPATIENT
Start: 2021-01-25

## 2021-01-25 RX ORDER — TORSEMIDE 20 MG/1
20 TABLET ORAL DAILY
Qty: 30 TABLET | Refills: 11 | Status: SHIPPED | OUTPATIENT
Start: 2021-01-25 | End: 2022-01-25

## 2021-01-25 RX ORDER — POTASSIUM CHLORIDE 20 MEQ/1
20 TABLET, EXTENDED RELEASE ORAL 2 TIMES DAILY
Qty: 180 TABLET | Refills: 3 | Status: SHIPPED | OUTPATIENT
Start: 2021-01-25

## 2021-04-23 ENCOUNTER — TELEPHONE (OUTPATIENT)
Dept: NEPHROLOGY | Facility: CLINIC | Age: 86
End: 2021-04-23

## 2021-09-09 ENCOUNTER — LAB VISIT (OUTPATIENT)
Dept: LAB | Facility: HOSPITAL | Age: 86
End: 2021-09-09
Attending: FAMILY MEDICINE
Payer: MEDICARE

## 2021-09-09 DIAGNOSIS — N18.4 CKD (CHRONIC KIDNEY DISEASE) STAGE 4, GFR 15-29 ML/MIN: ICD-10-CM

## 2021-09-09 LAB
ALBUMIN SERPL BCP-MCNC: 4 G/DL (ref 3.5–5.2)
ANION GAP SERPL CALC-SCNC: 13 MMOL/L (ref 8–16)
BASOPHILS # BLD AUTO: 0.06 K/UL (ref 0–0.2)
BASOPHILS NFR BLD: 0.8 % (ref 0–1.9)
BUN SERPL-MCNC: 39 MG/DL (ref 8–23)
CALCIUM SERPL-MCNC: 10 MG/DL (ref 8.7–10.5)
CHLORIDE SERPL-SCNC: 102 MMOL/L (ref 95–110)
CO2 SERPL-SCNC: 27 MMOL/L (ref 23–29)
CREAT SERPL-MCNC: 2.5 MG/DL (ref 0.5–1.4)
DIFFERENTIAL METHOD: ABNORMAL
EOSINOPHIL # BLD AUTO: 0.3 K/UL (ref 0–0.5)
EOSINOPHIL NFR BLD: 4 % (ref 0–8)
ERYTHROCYTE [DISTWIDTH] IN BLOOD BY AUTOMATED COUNT: 13.2 % (ref 11.5–14.5)
EST. GFR  (AFRICAN AMERICAN): 26 ML/MIN/1.73 M^2
EST. GFR  (NON AFRICAN AMERICAN): 23 ML/MIN/1.73 M^2
GLUCOSE SERPL-MCNC: 170 MG/DL (ref 70–110)
HCT VFR BLD AUTO: 43.4 % (ref 40–54)
HGB BLD-MCNC: 14.3 G/DL (ref 14–18)
IMM GRANULOCYTES # BLD AUTO: 0.04 K/UL (ref 0–0.04)
IMM GRANULOCYTES NFR BLD AUTO: 0.5 % (ref 0–0.5)
LYMPHOCYTES # BLD AUTO: 1.5 K/UL (ref 1–4.8)
LYMPHOCYTES NFR BLD: 19.8 % (ref 18–48)
MCH RBC QN AUTO: 32.6 PG (ref 27–31)
MCHC RBC AUTO-ENTMCNC: 32.9 G/DL (ref 32–36)
MCV RBC AUTO: 99 FL (ref 82–98)
MONOCYTES # BLD AUTO: 0.7 K/UL (ref 0.3–1)
MONOCYTES NFR BLD: 8.6 % (ref 4–15)
NEUTROPHILS # BLD AUTO: 5 K/UL (ref 1.8–7.7)
NEUTROPHILS NFR BLD: 66.3 % (ref 38–73)
NRBC BLD-RTO: 0 /100 WBC
PHOSPHATE SERPL-MCNC: 3.7 MG/DL (ref 2.7–4.5)
PLATELET # BLD AUTO: 244 K/UL (ref 150–450)
PMV BLD AUTO: 9.5 FL (ref 9.2–12.9)
POTASSIUM SERPL-SCNC: 4 MMOL/L (ref 3.5–5.1)
RBC # BLD AUTO: 4.38 M/UL (ref 4.6–6.2)
SODIUM SERPL-SCNC: 142 MMOL/L (ref 136–145)
WBC # BLD AUTO: 7.59 K/UL (ref 3.9–12.7)

## 2021-09-09 PROCEDURE — 85025 COMPLETE CBC W/AUTO DIFF WBC: CPT | Performed by: INTERNAL MEDICINE

## 2021-09-09 PROCEDURE — 80069 RENAL FUNCTION PANEL: CPT | Performed by: INTERNAL MEDICINE

## 2021-09-09 PROCEDURE — 82610 CYSTATIN C: CPT | Performed by: INTERNAL MEDICINE

## 2021-09-09 PROCEDURE — 36415 COLL VENOUS BLD VENIPUNCTURE: CPT | Performed by: INTERNAL MEDICINE

## 2021-09-12 LAB
CYSTATIN C SERPL-MCNC: 2.13 MG/L (ref 0.67–1.21)
GFR/BSA.PRED SERPLBLD CYS-BASED-ARV: 26 ML/MIN/BSA

## 2021-11-10 DIAGNOSIS — N18.30 STAGE 3 CHRONIC KIDNEY DISEASE, UNSPECIFIED WHETHER STAGE 3A OR 3B CKD: Primary | ICD-10-CM
